# Patient Record
Sex: FEMALE | Race: WHITE | Employment: UNEMPLOYED | URBAN - METROPOLITAN AREA
[De-identification: names, ages, dates, MRNs, and addresses within clinical notes are randomized per-mention and may not be internally consistent; named-entity substitution may affect disease eponyms.]

---

## 2017-01-12 ENCOUNTER — TELEPHONE (OUTPATIENT)
Dept: FAMILY MEDICINE CLINIC | Age: 62
End: 2017-01-12

## 2017-01-12 ENCOUNTER — OFFICE VISIT (OUTPATIENT)
Dept: FAMILY MEDICINE CLINIC | Age: 62
End: 2017-01-12

## 2017-01-12 VITALS
TEMPERATURE: 98.1 F | HEART RATE: 100 BPM | RESPIRATION RATE: 16 BRPM | WEIGHT: 113 LBS | BODY MASS INDEX: 19.29 KG/M2 | SYSTOLIC BLOOD PRESSURE: 138 MMHG | HEIGHT: 64 IN | OXYGEN SATURATION: 95 % | DIASTOLIC BLOOD PRESSURE: 82 MMHG

## 2017-01-12 DIAGNOSIS — M54.5 CHRONIC LOW BACK PAIN, UNSPECIFIED BACK PAIN LATERALITY, WITH SCIATICA PRESENCE UNSPECIFIED: ICD-10-CM

## 2017-01-12 DIAGNOSIS — J44.9 CHRONIC OBSTRUCTIVE PULMONARY DISEASE, UNSPECIFIED COPD TYPE (HCC): Primary | ICD-10-CM

## 2017-01-12 DIAGNOSIS — K21.00 REFLUX ESOPHAGITIS: ICD-10-CM

## 2017-01-12 DIAGNOSIS — G89.29 CHRONIC LOW BACK PAIN, UNSPECIFIED BACK PAIN LATERALITY, WITH SCIATICA PRESENCE UNSPECIFIED: ICD-10-CM

## 2017-01-12 PROCEDURE — 3017F COLORECTAL CA SCREEN DOC REV: CPT | Performed by: FAMILY MEDICINE

## 2017-01-12 PROCEDURE — 4004F PT TOBACCO SCREEN RCVD TLK: CPT | Performed by: FAMILY MEDICINE

## 2017-01-12 PROCEDURE — G8926 SPIRO NO PERF OR DOC: HCPCS | Performed by: FAMILY MEDICINE

## 2017-01-12 PROCEDURE — 3014F SCREEN MAMMO DOC REV: CPT | Performed by: FAMILY MEDICINE

## 2017-01-12 PROCEDURE — G8428 CUR MEDS NOT DOCUMENT: HCPCS | Performed by: FAMILY MEDICINE

## 2017-01-12 PROCEDURE — G8420 CALC BMI NORM PARAMETERS: HCPCS | Performed by: FAMILY MEDICINE

## 2017-01-12 PROCEDURE — G8484 FLU IMMUNIZE NO ADMIN: HCPCS | Performed by: FAMILY MEDICINE

## 2017-01-12 PROCEDURE — 3023F SPIROM DOC REV: CPT | Performed by: FAMILY MEDICINE

## 2017-01-12 PROCEDURE — 99214 OFFICE O/P EST MOD 30 MIN: CPT | Performed by: FAMILY MEDICINE

## 2017-01-12 RX ORDER — ALBUTEROL SULFATE 90 UG/1
2 AEROSOL, METERED RESPIRATORY (INHALATION) EVERY 6 HOURS PRN
Qty: 1 INHALER | Refills: 5 | Status: CANCELLED | OUTPATIENT
Start: 2017-01-12

## 2017-01-12 RX ORDER — CYCLOBENZAPRINE HCL 10 MG
TABLET ORAL
Qty: 180 TABLET | Refills: 1 | Status: SHIPPED | OUTPATIENT
Start: 2017-01-12 | End: 2017-07-13 | Stop reason: SDUPTHER

## 2017-01-12 RX ORDER — OMEPRAZOLE 20 MG/1
20 CAPSULE, DELAYED RELEASE ORAL DAILY
Qty: 90 CAPSULE | Refills: 1 | Status: SHIPPED | OUTPATIENT
Start: 2017-01-12 | End: 2017-08-03 | Stop reason: SDUPTHER

## 2017-01-12 RX ORDER — THEOPHYLLINE 300 MG/1
TABLET, EXTENDED RELEASE ORAL
Qty: 180 TABLET | Refills: 1 | Status: SHIPPED | OUTPATIENT
Start: 2017-01-12 | End: 2017-05-26 | Stop reason: SDUPTHER

## 2017-02-14 ENCOUNTER — TELEPHONE (OUTPATIENT)
Dept: SURGERY | Age: 62
End: 2017-02-14

## 2017-05-31 RX ORDER — THEOPHYLLINE 300 MG/1
TABLET, EXTENDED RELEASE ORAL
Qty: 60 TABLET | Refills: 1 | Status: SHIPPED | OUTPATIENT
Start: 2017-05-31 | End: 2017-06-01 | Stop reason: SDUPTHER

## 2017-06-01 RX ORDER — THEOPHYLLINE 300 MG/1
TABLET, EXTENDED RELEASE ORAL
Qty: 180 TABLET | Refills: 3 | Status: SHIPPED | OUTPATIENT
Start: 2017-06-01 | End: 2017-11-14 | Stop reason: SDUPTHER

## 2017-07-13 RX ORDER — CYCLOBENZAPRINE HCL 10 MG
TABLET ORAL
Qty: 180 TABLET | Refills: 1 | Status: SHIPPED | OUTPATIENT
Start: 2017-07-13 | End: 2017-08-03 | Stop reason: SDUPTHER

## 2017-08-03 RX ORDER — OMEPRAZOLE 20 MG/1
20 CAPSULE, DELAYED RELEASE ORAL DAILY
Qty: 90 CAPSULE | Refills: 1 | Status: SHIPPED | OUTPATIENT
Start: 2017-08-03 | End: 2017-11-14 | Stop reason: SDUPTHER

## 2017-08-03 RX ORDER — CYCLOBENZAPRINE HCL 10 MG
TABLET ORAL
Qty: 180 TABLET | Refills: 1 | Status: SHIPPED | OUTPATIENT
Start: 2017-08-03 | End: 2017-11-14 | Stop reason: SDUPTHER

## 2017-09-15 ENCOUNTER — TELEPHONE (OUTPATIENT)
Dept: FAMILY MEDICINE CLINIC | Age: 62
End: 2017-09-15

## 2017-09-15 RX ORDER — CIPROFLOXACIN 500 MG/1
500 TABLET, FILM COATED ORAL 2 TIMES DAILY
Qty: 14 TABLET | Refills: 0 | Status: SHIPPED | OUTPATIENT
Start: 2017-09-15 | End: 2017-09-22

## 2017-10-26 ENCOUNTER — TELEPHONE (OUTPATIENT)
Dept: FAMILY MEDICINE CLINIC | Age: 62
End: 2017-10-26

## 2017-11-14 ENCOUNTER — OFFICE VISIT (OUTPATIENT)
Dept: FAMILY MEDICINE CLINIC | Age: 62
End: 2017-11-14

## 2017-11-14 VITALS
RESPIRATION RATE: 16 BRPM | HEART RATE: 68 BPM | HEIGHT: 64 IN | SYSTOLIC BLOOD PRESSURE: 130 MMHG | BODY MASS INDEX: 19.63 KG/M2 | WEIGHT: 115 LBS | OXYGEN SATURATION: 98 % | DIASTOLIC BLOOD PRESSURE: 80 MMHG

## 2017-11-14 DIAGNOSIS — G89.29 CHRONIC LOW BACK PAIN, UNSPECIFIED BACK PAIN LATERALITY, WITH SCIATICA PRESENCE UNSPECIFIED: ICD-10-CM

## 2017-11-14 DIAGNOSIS — K21.00 REFLUX ESOPHAGITIS: ICD-10-CM

## 2017-11-14 DIAGNOSIS — J44.9 CHRONIC OBSTRUCTIVE PULMONARY DISEASE, UNSPECIFIED COPD TYPE (HCC): Primary | ICD-10-CM

## 2017-11-14 DIAGNOSIS — M54.5 CHRONIC LOW BACK PAIN, UNSPECIFIED BACK PAIN LATERALITY, WITH SCIATICA PRESENCE UNSPECIFIED: ICD-10-CM

## 2017-11-14 LAB
APPEARANCE FLUID: NORMAL
BILIRUBIN, POC: NORMAL
BLOOD URINE, POC: NORMAL
CLARITY, POC: CLEAR
COLOR, POC: YELLOW
GLUCOSE URINE, POC: NORMAL
KETONES, POC: NORMAL
LEUKOCYTE EST, POC: NORMAL
NITRITE, POC: NORMAL
PH, POC: 5.5
PROTEIN, POC: NORMAL
SPECIFIC GRAVITY, POC: 1.01
UROBILINOGEN, POC: 0.2

## 2017-11-14 PROCEDURE — 81002 URINALYSIS NONAUTO W/O SCOPE: CPT | Performed by: FAMILY MEDICINE

## 2017-11-14 PROCEDURE — 3017F COLORECTAL CA SCREEN DOC REV: CPT | Performed by: FAMILY MEDICINE

## 2017-11-14 PROCEDURE — 3023F SPIROM DOC REV: CPT | Performed by: FAMILY MEDICINE

## 2017-11-14 PROCEDURE — G8420 CALC BMI NORM PARAMETERS: HCPCS | Performed by: FAMILY MEDICINE

## 2017-11-14 PROCEDURE — G8926 SPIRO NO PERF OR DOC: HCPCS | Performed by: FAMILY MEDICINE

## 2017-11-14 PROCEDURE — 99214 OFFICE O/P EST MOD 30 MIN: CPT | Performed by: FAMILY MEDICINE

## 2017-11-14 PROCEDURE — G8484 FLU IMMUNIZE NO ADMIN: HCPCS | Performed by: FAMILY MEDICINE

## 2017-11-14 PROCEDURE — 3014F SCREEN MAMMO DOC REV: CPT | Performed by: FAMILY MEDICINE

## 2017-11-14 PROCEDURE — G8427 DOCREV CUR MEDS BY ELIG CLIN: HCPCS | Performed by: FAMILY MEDICINE

## 2017-11-14 PROCEDURE — 4004F PT TOBACCO SCREEN RCVD TLK: CPT | Performed by: FAMILY MEDICINE

## 2017-11-14 RX ORDER — ALBUTEROL SULFATE 90 UG/1
2 AEROSOL, METERED RESPIRATORY (INHALATION) EVERY 6 HOURS PRN
Qty: 1 INHALER | Refills: 5 | Status: SHIPPED | OUTPATIENT
Start: 2017-11-14

## 2017-11-14 RX ORDER — THEOPHYLLINE 300 MG/1
TABLET, EXTENDED RELEASE ORAL
Qty: 180 TABLET | Refills: 1 | Status: SHIPPED | OUTPATIENT
Start: 2017-11-14

## 2017-11-14 RX ORDER — CYCLOBENZAPRINE HCL 10 MG
TABLET ORAL
Qty: 180 TABLET | Refills: 1 | Status: SHIPPED | OUTPATIENT
Start: 2017-11-14

## 2017-11-14 RX ORDER — OMEPRAZOLE 20 MG/1
20 CAPSULE, DELAYED RELEASE ORAL DAILY
Qty: 90 CAPSULE | Refills: 1 | Status: SHIPPED | OUTPATIENT
Start: 2017-11-14

## 2017-11-14 RX ORDER — NITROFURANTOIN 25; 75 MG/1; MG/1
100 CAPSULE ORAL 2 TIMES DAILY
Qty: 20 CAPSULE | Refills: 0 | Status: SHIPPED | OUTPATIENT
Start: 2017-11-14 | End: 2018-08-17 | Stop reason: ALTCHOICE

## 2017-11-14 ASSESSMENT — PATIENT HEALTH QUESTIONNAIRE - PHQ9
2. FEELING DOWN, DEPRESSED OR HOPELESS: 0
SUM OF ALL RESPONSES TO PHQ QUESTIONS 1-9: 0
1. LITTLE INTEREST OR PLEASURE IN DOING THINGS: 0
SUM OF ALL RESPONSES TO PHQ9 QUESTIONS 1 & 2: 0

## 2017-11-14 NOTE — PROGRESS NOTES
SUBJECTIVE:  F/U COPD, GERD, Chronic low back pain. States dyspnea improved with continued use of Spiriva, needs refill. No side effects. She wants to continue to avoid narcotics for her chronic low back pain, desires refill of Flexeril - helps with no sedation or other side effects. GERD sx's controlled with omeprazole, with no side effects. She desires UA due to low back and left flank discomfort. Tob: 1 ppd. OBJECTIVE:  /80 (Site: Right Arm, Position: Sitting, Cuff Size: Medium Adult)   Pulse 68   Resp 16   Ht 5' 4\" (1.626 m)   Wt 115 lb (52.2 kg)   SpO2 98%   BMI 19.74 kg/m²     Lungs: Faint expiratory wheeze bilaterally. ASSESSMENT:  1. Chronic obstructive pulmonary disease, unspecified COPD type ([address]    2. Reflux esophagitis    3. Chronic low back pain, unspecified back pain laterality, with sciatica presence unspecified        PLAN:    Orders Placed This Encounter   Medications    cyclobenzaprine (FLEXERIL) 10 MG tablet     Sig: TAKE 1 TABLET BY MOUTH 2 TIMES DAILY AS NEEDED FOR MUSCLE SPASMS     Dispense:  180 tablet     Refill:  1     Generic For:FLEXERIL   10MG  01/20/2016 5:23:18 PM    tiotropium (Fleurette Genesis) 18 MCG inhalation capsule     Sig: Inhale 1 capsule into the lungs daily     Dispense:  180 capsule     Refill:  1    theophylline (THEODUR) 300 MG extended release tablet     Sig: TAKE 1 TABLET TWICE A DAY     Dispense:  180 tablet     Refill:  1    omeprazole (PRILOSEC) 20 MG delayed release capsule     Sig: Take 1 capsule by mouth daily     Dispense:  90 capsule     Refill:  1    albuterol sulfate  (90 Base) MCG/ACT inhaler     Sig: Inhale 2 puffs into the lungs every 6 hours as needed (Take as needed)     Dispense:  1 Inhaler     Refill:  5       Orders Placed This Encounter   Procedures    POCT Urinalysis no Micro       Follow-up appointment in 6 months.

## 2018-02-12 ENCOUNTER — TELEPHONE (OUTPATIENT)
Dept: CASE MANAGEMENT | Age: 63
End: 2018-02-12

## 2018-08-14 ENCOUNTER — OFFICE VISIT (OUTPATIENT)
Dept: ORTHOPEDIC SURGERY | Age: 63
End: 2018-08-14

## 2018-08-14 VITALS
BODY MASS INDEX: 21.34 KG/M2 | SYSTOLIC BLOOD PRESSURE: 109 MMHG | DIASTOLIC BLOOD PRESSURE: 69 MMHG | WEIGHT: 125 LBS | HEART RATE: 71 BPM | HEIGHT: 64 IN

## 2018-08-14 DIAGNOSIS — M79.672 LEFT FOOT PAIN: Primary | ICD-10-CM

## 2018-08-14 DIAGNOSIS — S92.032A CLOSED DISPLACED AVULSION FRACTURE OF TUBEROSITY OF LEFT CALCANEUS, INITIAL ENCOUNTER: ICD-10-CM

## 2018-08-14 PROCEDURE — 99203 OFFICE O/P NEW LOW 30 MIN: CPT | Performed by: ORTHOPAEDIC SURGERY

## 2018-08-14 RX ORDER — CEPHALEXIN 500 MG/1
500 CAPSULE ORAL 4 TIMES DAILY
Qty: 8 CAPSULE | Refills: 0 | Status: SHIPPED | OUTPATIENT
Start: 2018-08-14

## 2018-08-14 RX ORDER — OXYCODONE HYDROCHLORIDE AND ACETAMINOPHEN 5; 325 MG/1; MG/1
1 TABLET ORAL EVERY 6 HOURS PRN
Qty: 28 TABLET | Refills: 0 | Status: SHIPPED | OUTPATIENT
Start: 2018-08-14 | End: 2018-08-21

## 2018-08-15 PROBLEM — S92.032A CLOSED DISPLACED AVULSION FRACTURE OF TUBEROSITY OF LEFT CALCANEUS: Status: ACTIVE | Noted: 2018-08-15

## 2018-08-15 NOTE — PROGRESS NOTES
left    Special Tests:  Negative Call test    Skin: There are no rashes, ulcerations or lesions. Gait: Antalgic    Reflex 2+ and symmetric    Additional Comments:       Additional Examinations:         Right Lower Extremity: Examination of the right lower extremity does not show any tenderness, deformity or injury. Range of motion is unremarkable. There is no gross instability. There are no rashes, ulcerations or lesions. Strength and tone are normal.    Radiology:     X-rays obtained and reviewed in office:  Views 2  Location left calcaneus  Impression shows evidence of the tongue-type fracture involving the tuberosity with at least 2-3 cm of displacement          Assessment :  Left displaced calcaneus fracture    Impression:  Encounter Diagnoses   Name Primary?  Left foot pain Yes    Closed displaced avulsion fracture of tuberosity of left calcaneus, initial encounter        Office Procedures:  Orders Placed This Encounter   Procedures    XR CALCANEUS LEFT (MIN 2 VIEWS)     Specify if WB or Non-WB     Order Specific Question:   Reason for exam:     Answer:   Heel Pain       Treatment Plan:  I spent 30 minutes with this patient greater than 50% Face-To-Face Discussing Treatment Options. I Would Recommend She Have This Open Reduced and Internally Fixated. Risks of Surgery Were Discussed Including Her Risk of Smoking. Smoking Cessation Was Discussed Approximately 5 Minutes. I Gave Her Percocet and Keflex and Put Her into a [place] Dressing with PSO.   She'll Follow up with Me Postop

## 2018-08-17 ENCOUNTER — OFFICE VISIT (OUTPATIENT)
Dept: ORTHOPEDIC SURGERY | Age: 63
End: 2018-08-17

## 2018-08-17 ENCOUNTER — ANESTHESIA EVENT (OUTPATIENT)
Dept: OPERATING ROOM | Age: 63
End: 2018-08-17
Payer: COMMERCIAL

## 2018-08-17 VITALS
WEIGHT: 120 LBS | DIASTOLIC BLOOD PRESSURE: 63 MMHG | SYSTOLIC BLOOD PRESSURE: 116 MMHG | HEART RATE: 88 BPM | HEIGHT: 64 IN | BODY MASS INDEX: 20.49 KG/M2

## 2018-08-17 DIAGNOSIS — S92.032D CLOSED DISPLACED AVULSION FRACTURE OF TUBEROSITY OF LEFT CALCANEUS WITH ROUTINE HEALING, SUBSEQUENT ENCOUNTER: Primary | ICD-10-CM

## 2018-08-17 PROCEDURE — 99212 OFFICE O/P EST SF 10 MIN: CPT | Performed by: ORTHOPAEDIC SURGERY

## 2018-08-17 NOTE — PROGRESS NOTES
Subjective: Patient states that she is here for follow-up of her left calcaneus tuberosity fracture. States that her pain is minimal and she is not smoking as much. Here for a skin check  Objective: Physical exam shows swelling is significantly decreased from previous her skin looks really good except for a small area on the lateral aspect of the lateral malleolus. No evidence of compartment syndrome no fracture blisters no erythema or signs of infection  Imaging:  Assessment and plan: We put her into a new splint with a stirrup to help keep the swelling laterally down. She'll get set up for surgery on Monday it's her no guarantees were given or implied.   We did discuss smoking cessation

## 2018-08-20 ENCOUNTER — ANESTHESIA (OUTPATIENT)
Dept: OPERATING ROOM | Age: 63
End: 2018-08-20
Payer: COMMERCIAL

## 2018-08-20 ENCOUNTER — HOSPITAL ENCOUNTER (OUTPATIENT)
Age: 63
Setting detail: OUTPATIENT SURGERY
Discharge: HOME | End: 2018-08-20
Attending: ORTHOPAEDIC SURGERY | Admitting: ORTHOPAEDIC SURGERY
Payer: COMMERCIAL

## 2018-08-20 VITALS
BODY MASS INDEX: 21.34 KG/M2 | DIASTOLIC BLOOD PRESSURE: 71 MMHG | TEMPERATURE: 98 F | SYSTOLIC BLOOD PRESSURE: 108 MMHG | OXYGEN SATURATION: 93 % | RESPIRATION RATE: 20 BRPM | WEIGHT: 125 LBS | HEIGHT: 64 IN | HEART RATE: 78 BPM

## 2018-08-20 VITALS
DIASTOLIC BLOOD PRESSURE: 68 MMHG | OXYGEN SATURATION: 98 % | RESPIRATION RATE: 4 BRPM | SYSTOLIC BLOOD PRESSURE: 105 MMHG

## 2018-08-20 DIAGNOSIS — S92.032D CLOSED DISPLACED AVULSION FRACTURE OF TUBEROSITY OF LEFT CALCANEUS WITH ROUTINE HEALING, SUBSEQUENT ENCOUNTER: Primary | ICD-10-CM

## 2018-08-20 PROCEDURE — 7100000000 HC PACU RECOVERY - FIRST 15 MIN: Performed by: ORTHOPAEDIC SURGERY

## 2018-08-20 PROCEDURE — 3600000014 HC SURGERY LEVEL 4 ADDTL 15MIN: Performed by: ORTHOPAEDIC SURGERY

## 2018-08-20 PROCEDURE — 6360000002 HC RX W HCPCS: Performed by: NURSE ANESTHETIST, CERTIFIED REGISTERED

## 2018-08-20 PROCEDURE — 3600000004 HC SURGERY LEVEL 4 BASE: Performed by: ORTHOPAEDIC SURGERY

## 2018-08-20 PROCEDURE — 64445 NJX AA&/STRD SCIATIC NRV IMG: CPT | Performed by: ANESTHESIOLOGY

## 2018-08-20 PROCEDURE — 2500000003 HC RX 250 WO HCPCS: Performed by: NURSE ANESTHETIST, CERTIFIED REGISTERED

## 2018-08-20 PROCEDURE — 2500000003 HC RX 250 WO HCPCS: Performed by: ORTHOPAEDIC SURGERY

## 2018-08-20 PROCEDURE — 2580000003 HC RX 258: Performed by: ORTHOPAEDIC SURGERY

## 2018-08-20 PROCEDURE — C1713 ANCHOR/SCREW BN/BN,TIS/BN: HCPCS | Performed by: ORTHOPAEDIC SURGERY

## 2018-08-20 PROCEDURE — 3700000001 HC ADD 15 MINUTES (ANESTHESIA): Performed by: ORTHOPAEDIC SURGERY

## 2018-08-20 PROCEDURE — 7100000001 HC PACU RECOVERY - ADDTL 15 MIN: Performed by: ORTHOPAEDIC SURGERY

## 2018-08-20 PROCEDURE — 2580000003 HC RX 258: Performed by: ANESTHESIOLOGY

## 2018-08-20 PROCEDURE — 3700000000 HC ANESTHESIA ATTENDED CARE: Performed by: ORTHOPAEDIC SURGERY

## 2018-08-20 PROCEDURE — 7100000011 HC PHASE II RECOVERY - ADDTL 15 MIN: Performed by: ORTHOPAEDIC SURGERY

## 2018-08-20 PROCEDURE — 7100000010 HC PHASE II RECOVERY - FIRST 15 MIN: Performed by: ORTHOPAEDIC SURGERY

## 2018-08-20 PROCEDURE — 2709999900 HC NON-CHARGEABLE SUPPLY: Performed by: ORTHOPAEDIC SURGERY

## 2018-08-20 DEVICE — SCREW BNE L42MM DIA4.5MM PROX CORT TIB S STL ST LOK FULL: Type: IMPLANTABLE DEVICE | Site: ANKLE | Status: FUNCTIONAL

## 2018-08-20 DEVICE — WASHER ORTH OD13.5MM ID4MM PEEK 6% BASO4 SPIK FOR 3.5/4MM: Type: IMPLANTABLE DEVICE | Site: ANKLE | Status: FUNCTIONAL

## 2018-08-20 DEVICE — SCREW BNE L46MM DIA4.5MM PROX CORT TIB S STL ST LOK FULL: Type: IMPLANTABLE DEVICE | Site: ANKLE | Status: FUNCTIONAL

## 2018-08-20 RX ORDER — LIDOCAINE HYDROCHLORIDE 10 MG/ML
1 INJECTION, SOLUTION EPIDURAL; INFILTRATION; INTRACAUDAL; PERINEURAL
Status: DISCONTINUED | OUTPATIENT
Start: 2018-08-20 | End: 2018-08-20 | Stop reason: HOSPADM

## 2018-08-20 RX ORDER — HYDRALAZINE HYDROCHLORIDE 20 MG/ML
5 INJECTION INTRAMUSCULAR; INTRAVENOUS EVERY 30 MIN PRN
Status: DISCONTINUED | OUTPATIENT
Start: 2018-08-20 | End: 2018-08-20 | Stop reason: HOSPADM

## 2018-08-20 RX ORDER — LIDOCAINE HYDROCHLORIDE 20 MG/ML
INJECTION, SOLUTION INFILTRATION; PERINEURAL PRN
Status: DISCONTINUED | OUTPATIENT
Start: 2018-08-20 | End: 2018-08-20 | Stop reason: SDUPTHER

## 2018-08-20 RX ORDER — MEPERIDINE HYDROCHLORIDE 50 MG/ML
12.5 INJECTION INTRAMUSCULAR; INTRAVENOUS; SUBCUTANEOUS EVERY 5 MIN PRN
Status: DISCONTINUED | OUTPATIENT
Start: 2018-08-20 | End: 2018-08-20 | Stop reason: HOSPADM

## 2018-08-20 RX ORDER — SODIUM CHLORIDE, SODIUM LACTATE, POTASSIUM CHLORIDE, CALCIUM CHLORIDE 600; 310; 30; 20 MG/100ML; MG/100ML; MG/100ML; MG/100ML
INJECTION, SOLUTION INTRAVENOUS CONTINUOUS
Status: DISCONTINUED | OUTPATIENT
Start: 2018-08-20 | End: 2018-08-20 | Stop reason: HOSPADM

## 2018-08-20 RX ORDER — OXYCODONE HYDROCHLORIDE AND ACETAMINOPHEN 5; 325 MG/1; MG/1
2 TABLET ORAL PRN
Status: DISCONTINUED | OUTPATIENT
Start: 2018-08-20 | End: 2018-08-20 | Stop reason: HOSPADM

## 2018-08-20 RX ORDER — ROCURONIUM BROMIDE 10 MG/ML
INJECTION, SOLUTION INTRAVENOUS PRN
Status: DISCONTINUED | OUTPATIENT
Start: 2018-08-20 | End: 2018-08-20 | Stop reason: SDUPTHER

## 2018-08-20 RX ORDER — LABETALOL HYDROCHLORIDE 5 MG/ML
5 INJECTION, SOLUTION INTRAVENOUS
Status: DISCONTINUED | OUTPATIENT
Start: 2018-08-20 | End: 2018-08-20 | Stop reason: HOSPADM

## 2018-08-20 RX ORDER — OXYCODONE HYDROCHLORIDE AND ACETAMINOPHEN 5; 325 MG/1; MG/1
1 TABLET ORAL PRN
Status: DISCONTINUED | OUTPATIENT
Start: 2018-08-20 | End: 2018-08-20 | Stop reason: HOSPADM

## 2018-08-20 RX ORDER — DIPHENHYDRAMINE HYDROCHLORIDE 50 MG/ML
6.25 INJECTION INTRAMUSCULAR; INTRAVENOUS
Status: DISCONTINUED | OUTPATIENT
Start: 2018-08-20 | End: 2018-08-20 | Stop reason: HOSPADM

## 2018-08-20 RX ORDER — SODIUM CHLORIDE 0.9 % (FLUSH) 0.9 %
10 SYRINGE (ML) INJECTION EVERY 12 HOURS SCHEDULED
Status: DISCONTINUED | OUTPATIENT
Start: 2018-08-20 | End: 2018-08-20 | Stop reason: HOSPADM

## 2018-08-20 RX ORDER — MAGNESIUM HYDROXIDE 1200 MG/15ML
LIQUID ORAL CONTINUOUS PRN
Status: DISCONTINUED | OUTPATIENT
Start: 2018-08-20 | End: 2018-08-20 | Stop reason: HOSPADM

## 2018-08-20 RX ORDER — FENTANYL CITRATE 50 UG/ML
INJECTION, SOLUTION INTRAMUSCULAR; INTRAVENOUS PRN
Status: DISCONTINUED | OUTPATIENT
Start: 2018-08-20 | End: 2018-08-20 | Stop reason: SDUPTHER

## 2018-08-20 RX ORDER — ONDANSETRON 2 MG/ML
4 INJECTION INTRAMUSCULAR; INTRAVENOUS EVERY 30 MIN PRN
Status: DISCONTINUED | OUTPATIENT
Start: 2018-08-20 | End: 2018-08-20 | Stop reason: HOSPADM

## 2018-08-20 RX ORDER — OXYCODONE HYDROCHLORIDE AND ACETAMINOPHEN 5; 325 MG/1; MG/1
1 TABLET ORAL EVERY 6 HOURS PRN
Qty: 28 TABLET | Refills: 0 | Status: SHIPPED | OUTPATIENT
Start: 2018-08-20 | End: 2018-08-27

## 2018-08-20 RX ORDER — SODIUM CHLORIDE 0.9 % (FLUSH) 0.9 %
10 SYRINGE (ML) INJECTION PRN
Status: DISCONTINUED | OUTPATIENT
Start: 2018-08-20 | End: 2018-08-20 | Stop reason: HOSPADM

## 2018-08-20 RX ORDER — ONDANSETRON 2 MG/ML
INJECTION INTRAMUSCULAR; INTRAVENOUS PRN
Status: DISCONTINUED | OUTPATIENT
Start: 2018-08-20 | End: 2018-08-20 | Stop reason: SDUPTHER

## 2018-08-20 RX ORDER — PROPOFOL 10 MG/ML
INJECTION, EMULSION INTRAVENOUS PRN
Status: DISCONTINUED | OUTPATIENT
Start: 2018-08-20 | End: 2018-08-20 | Stop reason: SDUPTHER

## 2018-08-20 RX ADMIN — LIDOCAINE HYDROCHLORIDE 40 MG: 20 INJECTION, SOLUTION INFILTRATION; PERINEURAL at 13:25

## 2018-08-20 RX ADMIN — PROPOFOL 150 MG: 10 INJECTION, EMULSION INTRAVENOUS at 13:25

## 2018-08-20 RX ADMIN — Medication 900 MG: at 13:21

## 2018-08-20 RX ADMIN — ONDANSETRON 4 MG: 2 INJECTION INTRAMUSCULAR; INTRAVENOUS at 13:50

## 2018-08-20 RX ADMIN — SODIUM CHLORIDE, POTASSIUM CHLORIDE, SODIUM LACTATE AND CALCIUM CHLORIDE: 600; 310; 30; 20 INJECTION, SOLUTION INTRAVENOUS at 12:55

## 2018-08-20 RX ADMIN — SUGAMMADEX 200 MG: 100 INJECTION, SOLUTION INTRAVENOUS at 14:25

## 2018-08-20 RX ADMIN — FENTANYL CITRATE 50 MCG: 50 INJECTION INTRAMUSCULAR; INTRAVENOUS at 13:25

## 2018-08-20 RX ADMIN — ROCURONIUM BROMIDE 40 MG: 10 SOLUTION INTRAVENOUS at 13:25

## 2018-08-20 RX ADMIN — PROPOFOL 50 MG: 10 INJECTION, EMULSION INTRAVENOUS at 14:25

## 2018-08-20 ASSESSMENT — PULMONARY FUNCTION TESTS
PIF_VALUE: 11
PIF_VALUE: 17
PIF_VALUE: 17
PIF_VALUE: 21
PIF_VALUE: 20
PIF_VALUE: 22
PIF_VALUE: 16
PIF_VALUE: 22
PIF_VALUE: 12
PIF_VALUE: 12
PIF_VALUE: 21
PIF_VALUE: 10
PIF_VALUE: 22
PIF_VALUE: 21
PIF_VALUE: 16
PIF_VALUE: 22
PIF_VALUE: 16
PIF_VALUE: 1
PIF_VALUE: 22
PIF_VALUE: 21
PIF_VALUE: 22
PIF_VALUE: 22
PIF_VALUE: 21
PIF_VALUE: 22
PIF_VALUE: 21
PIF_VALUE: 24
PIF_VALUE: 21
PIF_VALUE: 16
PIF_VALUE: 21
PIF_VALUE: 10
PIF_VALUE: 21
PIF_VALUE: 16
PIF_VALUE: 21
PIF_VALUE: 1
PIF_VALUE: 22
PIF_VALUE: 1
PIF_VALUE: 21
PIF_VALUE: 22
PIF_VALUE: 3
PIF_VALUE: 21
PIF_VALUE: 21
PIF_VALUE: 1
PIF_VALUE: 21
PIF_VALUE: 21
PIF_VALUE: 5
PIF_VALUE: 21
PIF_VALUE: 23
PIF_VALUE: 2
PIF_VALUE: 12
PIF_VALUE: 20
PIF_VALUE: 24
PIF_VALUE: 13
PIF_VALUE: 21
PIF_VALUE: 4
PIF_VALUE: 22
PIF_VALUE: 24
PIF_VALUE: 0
PIF_VALUE: 25
PIF_VALUE: 12
PIF_VALUE: 23
PIF_VALUE: 22
PIF_VALUE: 2
PIF_VALUE: 1
PIF_VALUE: 17
PIF_VALUE: 21
PIF_VALUE: 21
PIF_VALUE: 2
PIF_VALUE: 17
PIF_VALUE: 21

## 2018-08-20 ASSESSMENT — PAIN SCALES - GENERAL
PAINLEVEL_OUTOF10: 0

## 2018-08-20 ASSESSMENT — PAIN DESCRIPTION - DESCRIPTORS: DESCRIPTORS: THROBBING

## 2018-08-20 ASSESSMENT — LIFESTYLE VARIABLES: SMOKING_STATUS: 1

## 2018-08-20 ASSESSMENT — PAIN - FUNCTIONAL ASSESSMENT: PAIN_FUNCTIONAL_ASSESSMENT: 0-10

## 2018-08-20 NOTE — ANESTHESIA POSTPROCEDURE EVALUATION
143                 10/07/2016 05:08 AM        K                        3.6                 10/07/2016 05:08 AM        CL                       106                 10/07/2016 05:08 AM        CO2                      26                  10/07/2016 05:08 AM        BUN                      15                  10/07/2016 05:08 AM        CREATININE               0.8                 10/07/2016 05:08 AM        GLUCOSE                  90                  10/07/2016 05:08 AM   COAGS  No results found for: PROTIME, INR, APTT    Intake & Output: In: 1200 (I.V.:1200)  Out: -     Nausea & Vomiting:  No    Level of Consciousness:  Awake    Pain Assessment:  Adequate analgesia    Anesthesia Complications:  No apparent anesthetic complications    SUMMARY      Vital signs stable  OK to discharge from Stage I post anesthesia care.   Care transferred from Anesthesiology department on discharge from perioperative area

## 2018-08-20 NOTE — ANESTHESIA PRE PROCEDURE
Department of Anesthesiology  Preprocedure Note       Name:  Giana Galaviz   Age:  66 y.o.  :  1955                                          MRN:  [id number]         Date:  2018      Surgeon: Katelyn German):  Stanislav Feng MD    Procedure: Procedure(s):  OPEN REDUCTION INTERNAL FIXATION LEFT CALCANEUS FRACTURE    Medications prior to admission:   Prior to Admission medications    Medication Sig Start Date End Date Taking? Authorizing Provider   oxyCODONE-acetaminophen (PERCOCET) 5-325 MG per tablet Take 1 tablet by mouth every 6 hours as needed for Pain for up to 7 days. Intended supply: 7 days. Take lowest dose possible to manage pain.  Earliest Fill Date: 18  Stanislav Feng MD   cephALEXin [facility]) 500 MG capsule Take 1 capsule by mouth 4 times daily 18   Stanislav Feng MD   cyclobenzaprine (FLEXERIL) 10 MG tablet TAKE 1 TABLET BY MOUTH 2 TIMES DAILY AS NEEDED FOR MUSCLE SPASMS 17   Albertina Fernandez MD   tiotropium (Ulysses Massing) 18 MCG inhalation capsule Inhale 1 capsule into the lungs daily 17   Albertina Fernandez MD   theophylline (THEODUR) 300 MG extended release tablet TAKE 1 TABLET TWICE A DAY 17   Albertina Fernandez MD   omeprazole (PRILOSEC) 20 MG delayed release capsule Take 1 capsule by mouth daily 17   Albertina Fernandez MD   albuterol sulfate  (90 Base) MCG/ACT inhaler Inhale 2 puffs into the lungs every 6 hours as needed (Take as needed) 17   Albertina Fernandez MD   sertraline (ZOLOFT) 100 MG tablet Take 100 mg by mouth daily    Historical Provider, MD       Current medications:    Current Facility-Administered Medications   Medication Dose Route Frequency Provider Last Rate Last Dose    HYDROmorphone (DILAUDID) injection 0.5 mg  0.5 mg Intravenous Q10 Min PRN Niko Aragon MD        HYDROmorphone (DILAUDID) injection 0.5 mg  0.5 mg Intravenous Q5 Min PRN Niko Aragon MD       [facility]

## 2018-08-20 NOTE — OP NOTE
[place], [place] [address]                                 OPERATIVE REPORT    PATIENT NAME: Charline Byrnes                    :        1955  MED REC NO:   [id number]                          ROOM:  ACCOUNT NO:   [de-identified]                           ADMIT DATE: 2018  PROVIDER:     Jazmin Colvin MD    DATE OF PROCEDURE:  2018    PREOPERATIVE DIAGNOSIS:  Left calcaneus fracture. POSTOPERATIVE DIAGNOSIS:  Left calcaneus fracture. OPERATION PERFORMED:  ORIF left calcaneus fracture. PRIMARY SURGEON:  Jazmin Colvin MD    ANESTHESIA:  General.    ANTIBIOTICS:  Cleocin. DRAINS:  None. TUBES:  None. SPECIMENS:  None. ESTIMATED BLOOD LOSS:  Less than 50 mL. TOURNIQUET TIME:  Less than 1 hour. INDICATIONS:  The patient is a 61-year-old female that slipped on a _____  while in flip-flops on 2018 and was found to have a tongue type  calcaneus fracture. She is here now for open reduction internal fixation. Risks and benefits of surgery were explained to the patient. Informed  consent was signed in the chart prior to surgery. No guarantees were given  or implied. PROCEDURE:  The patient was brought to the operating room and after  adequate general anesthesia, was placed in the prone position. Bony  prominences were padded appropriately and then left lower extremity prepped  and draped in sterile fashion after a nonsterile tourniquet was placed  around the proximal aspect of her left upper thigh. Her leg was then  exsanguinated and tourniquet inflated to 350 mmHg. A longitudinal incision  was made on the posterior lateral aspect of the calcaneus extending from  approximately 3-4 cm above the insertion of the Achilles proximally. It  was carried down through the subcutaneous tissue using Metzenbaum scissors.   Care was taken to identify the sural nerve which was retracted anteriorly  away

## 2018-09-04 DIAGNOSIS — S92.032D CLOSED DISPLACED AVULSION FRACTURE OF TUBEROSITY OF LEFT CALCANEUS WITH ROUTINE HEALING, SUBSEQUENT ENCOUNTER: Primary | ICD-10-CM

## 2018-09-04 RX ORDER — OXYCODONE HYDROCHLORIDE AND ACETAMINOPHEN 5; 325 MG/1; MG/1
1 TABLET ORAL EVERY 8 HOURS PRN
Qty: 21 TABLET | Refills: 0 | Status: SHIPPED | OUTPATIENT
Start: 2018-09-04 | End: 2018-09-05 | Stop reason: CLARIF

## 2018-09-05 ENCOUNTER — OFFICE VISIT (OUTPATIENT)
Dept: ORTHOPEDIC SURGERY | Age: 63
End: 2018-09-05

## 2018-09-05 VITALS — SYSTOLIC BLOOD PRESSURE: 133 MMHG | HEART RATE: 89 BPM | DIASTOLIC BLOOD PRESSURE: 76 MMHG

## 2018-09-05 DIAGNOSIS — S92.032D CLOSED DISPLACED AVULSION FRACTURE OF TUBEROSITY OF LEFT CALCANEUS WITH ROUTINE HEALING, SUBSEQUENT ENCOUNTER: ICD-10-CM

## 2018-09-05 DIAGNOSIS — M79.672 CHRONIC HEEL PAIN, LEFT: Primary | ICD-10-CM

## 2018-09-05 DIAGNOSIS — G89.29 CHRONIC HEEL PAIN, LEFT: Primary | ICD-10-CM

## 2018-09-05 PROCEDURE — 99024 POSTOP FOLLOW-UP VISIT: CPT | Performed by: ORTHOPAEDIC SURGERY

## 2018-09-05 PROCEDURE — 29405 APPL SHORT LEG CAST: CPT | Performed by: ORTHOPAEDIC SURGERY

## 2018-09-05 RX ORDER — OXYCODONE HYDROCHLORIDE AND ACETAMINOPHEN 5; 325 MG/1; MG/1
1 TABLET ORAL EVERY 8 HOURS PRN
Qty: 21 TABLET | Refills: 0 | Status: SHIPPED | OUTPATIENT
Start: 2018-09-05 | End: 2018-09-12

## 2018-09-12 ENCOUNTER — OFFICE VISIT (OUTPATIENT)
Dept: ORTHOPEDIC SURGERY | Age: 63
End: 2018-09-12

## 2018-09-12 VITALS
WEIGHT: 120 LBS | BODY MASS INDEX: 20.49 KG/M2 | HEIGHT: 64 IN | HEART RATE: 78 BPM | DIASTOLIC BLOOD PRESSURE: 81 MMHG | SYSTOLIC BLOOD PRESSURE: 123 MMHG

## 2018-09-12 DIAGNOSIS — S92.032A CLOSED DISPLACED AVULSION FRACTURE OF TUBEROSITY OF LEFT CALCANEUS, INITIAL ENCOUNTER: Primary | ICD-10-CM

## 2018-09-12 PROCEDURE — 99024 POSTOP FOLLOW-UP VISIT: CPT | Performed by: ORTHOPAEDIC SURGERY

## 2018-09-12 RX ORDER — OXYCODONE HYDROCHLORIDE AND ACETAMINOPHEN 5; 325 MG/1; MG/1
1 TABLET ORAL 2 TIMES DAILY PRN
Qty: 14 TABLET | Refills: 0 | Status: SHIPPED | OUTPATIENT
Start: 2018-09-12 | End: 2018-09-19

## 2018-09-26 ENCOUNTER — OFFICE VISIT (OUTPATIENT)
Dept: ORTHOPEDIC SURGERY | Age: 63
End: 2018-09-26

## 2018-09-26 VITALS — HEART RATE: 117 BPM | SYSTOLIC BLOOD PRESSURE: 129 MMHG | DIASTOLIC BLOOD PRESSURE: 79 MMHG

## 2018-09-26 DIAGNOSIS — S92.032D CLOSED DISPLACED AVULSION FRACTURE OF TUBEROSITY OF LEFT CALCANEUS WITH ROUTINE HEALING, SUBSEQUENT ENCOUNTER: ICD-10-CM

## 2018-09-26 DIAGNOSIS — M79.672 PAIN OF LEFT HEEL: Primary | ICD-10-CM

## 2018-09-26 PROCEDURE — 99024 POSTOP FOLLOW-UP VISIT: CPT | Performed by: ORTHOPAEDIC SURGERY

## 2018-09-26 RX ORDER — OXYCODONE HYDROCHLORIDE AND ACETAMINOPHEN 5; 325 MG/1; MG/1
1 TABLET ORAL EVERY 8 HOURS PRN
Qty: 21 TABLET | Refills: 0 | Status: SHIPPED | OUTPATIENT
Start: 2018-09-26 | End: 2018-10-03

## 2018-10-17 ENCOUNTER — TELEPHONE (OUTPATIENT)
Dept: ORTHOPEDIC SURGERY | Age: 63
End: 2018-10-17

## 2018-10-17 ENCOUNTER — OFFICE VISIT (OUTPATIENT)
Dept: ORTHOPEDIC SURGERY | Age: 63
End: 2018-10-17
Payer: COMMERCIAL

## 2018-10-17 VITALS
HEART RATE: 111 BPM | BODY MASS INDEX: 20.47 KG/M2 | HEIGHT: 64 IN | WEIGHT: 119.93 LBS | DIASTOLIC BLOOD PRESSURE: 88 MMHG | SYSTOLIC BLOOD PRESSURE: 148 MMHG

## 2018-10-17 DIAGNOSIS — M79.672 CHRONIC HEEL PAIN, LEFT: Primary | ICD-10-CM

## 2018-10-17 DIAGNOSIS — G89.29 CHRONIC HEEL PAIN, LEFT: Primary | ICD-10-CM

## 2018-10-17 PROCEDURE — 99024 POSTOP FOLLOW-UP VISIT: CPT | Performed by: ORTHOPAEDIC SURGERY

## 2018-10-17 PROCEDURE — L4360 PNEUMAT WALKING BOOT PRE CST: HCPCS | Performed by: ORTHOPAEDIC SURGERY

## 2018-11-14 ENCOUNTER — OFFICE VISIT (OUTPATIENT)
Dept: ORTHOPEDIC SURGERY | Age: 63
End: 2018-11-14
Payer: COMMERCIAL

## 2018-11-14 VITALS
DIASTOLIC BLOOD PRESSURE: 82 MMHG | SYSTOLIC BLOOD PRESSURE: 126 MMHG | WEIGHT: 119.93 LBS | HEIGHT: 64 IN | BODY MASS INDEX: 20.47 KG/M2 | HEART RATE: 74 BPM

## 2018-11-14 DIAGNOSIS — S92.032A CLOSED DISPLACED AVULSION FRACTURE OF TUBEROSITY OF LEFT CALCANEUS, INITIAL ENCOUNTER: Primary | ICD-10-CM

## 2018-11-14 PROCEDURE — 99024 POSTOP FOLLOW-UP VISIT: CPT | Performed by: ORTHOPAEDIC SURGERY

## 2018-11-14 PROCEDURE — L3170 FOOT PLAS HEEL STABI PRE OTS: HCPCS | Performed by: ORTHOPAEDIC SURGERY

## 2018-11-14 NOTE — PROGRESS NOTES
Subjective: Patient states that she is here for follow-up of a left calcaneus fracture open reduction internal fixation the . She states that she has some weakness and balance issues but no pain in the left heel. Objective: Physical exam shows incisions well-healed now. Swelling is minimal she does have peripheral vascular disease and is a smoker. Anterior drawer and talar tilt showed no gross laxity. 10° of dorsiflexion and 20-30° of plantarflexion  Imagin views of the left calcaneus show no change in the position of her hardware her more posterior screw appears to be toggling somewhat  Assessment and plan: I told like her to stay in the boot and she can start physical therapy gradually increasing her weightbearing if she has any increased pain she should stop immediately.   I also gave her Visco heels follow-up with me in 6 weeks repeat x-rays of the calcaneus

## 2018-12-19 ENCOUNTER — OFFICE VISIT (OUTPATIENT)
Dept: ORTHOPEDIC SURGERY | Age: 63
End: 2018-12-19
Payer: COMMERCIAL

## 2018-12-19 VITALS
HEIGHT: 64 IN | BODY MASS INDEX: 20.47 KG/M2 | DIASTOLIC BLOOD PRESSURE: 74 MMHG | SYSTOLIC BLOOD PRESSURE: 124 MMHG | WEIGHT: 119.93 LBS | HEART RATE: 127 BPM

## 2018-12-19 DIAGNOSIS — M79.672 CHRONIC HEEL PAIN, LEFT: Primary | ICD-10-CM

## 2018-12-19 DIAGNOSIS — S92.032G: ICD-10-CM

## 2018-12-19 DIAGNOSIS — G89.29 CHRONIC HEEL PAIN, LEFT: Primary | ICD-10-CM

## 2018-12-19 PROCEDURE — 99212 OFFICE O/P EST SF 10 MIN: CPT | Performed by: ORTHOPAEDIC SURGERY

## 2025-02-14 ENCOUNTER — HOSPITAL ENCOUNTER (INPATIENT)
Age: 70
LOS: 4 days | Discharge: HOME HEALTH | DRG: 193 | End: 2025-02-18
Payer: COMMERCIAL

## 2025-02-14 DIAGNOSIS — R53.81 DEBILITY: Primary | ICD-10-CM

## 2025-02-14 PROBLEM — J10.1 INFLUENZA A: Status: ACTIVE | Noted: 2025-02-14

## 2025-02-14 PROCEDURE — 6370000000 HC RX 637 (ALT 250 FOR IP): Performed by: HOSPITALIST

## 2025-02-14 PROCEDURE — 6360000002 HC RX W HCPCS: Performed by: HOSPITALIST

## 2025-02-14 PROCEDURE — 2700000000 HC OXYGEN THERAPY PER DAY

## 2025-02-14 PROCEDURE — 94761 N-INVAS EAR/PLS OXIMETRY MLT: CPT

## 2025-02-14 PROCEDURE — 2060000000 HC ICU INTERMEDIATE R&B

## 2025-02-14 PROCEDURE — 2500000003 HC RX 250 WO HCPCS: Performed by: HOSPITALIST

## 2025-02-14 RX ORDER — ENOXAPARIN SODIUM 100 MG/ML
40 INJECTION SUBCUTANEOUS DAILY
Status: DISCONTINUED | OUTPATIENT
Start: 2025-02-15 | End: 2025-02-15

## 2025-02-14 RX ORDER — SODIUM CHLORIDE 0.9 % (FLUSH) 0.9 %
5-40 SYRINGE (ML) INJECTION EVERY 12 HOURS SCHEDULED
Status: DISCONTINUED | OUTPATIENT
Start: 2025-02-14 | End: 2025-02-18 | Stop reason: HOSPADM

## 2025-02-14 RX ORDER — LEVOFLOXACIN 5 MG/ML
500 INJECTION, SOLUTION INTRAVENOUS EVERY 24 HOURS
Status: DISCONTINUED | OUTPATIENT
Start: 2025-02-14 | End: 2025-02-15

## 2025-02-14 RX ORDER — IPRATROPIUM BROMIDE AND ALBUTEROL SULFATE 2.5; .5 MG/3ML; MG/3ML
1 SOLUTION RESPIRATORY (INHALATION)
Status: DISCONTINUED | OUTPATIENT
Start: 2025-02-15 | End: 2025-02-15

## 2025-02-14 RX ORDER — SODIUM CHLORIDE 0.9 % (FLUSH) 0.9 %
5-40 SYRINGE (ML) INJECTION PRN
Status: DISCONTINUED | OUTPATIENT
Start: 2025-02-14 | End: 2025-02-18 | Stop reason: HOSPADM

## 2025-02-14 RX ORDER — MAGNESIUM SULFATE IN WATER 40 MG/ML
2000 INJECTION, SOLUTION INTRAVENOUS PRN
Status: DISCONTINUED | OUTPATIENT
Start: 2025-02-14 | End: 2025-02-18 | Stop reason: HOSPADM

## 2025-02-14 RX ORDER — OSELTAMIVIR PHOSPHATE 75 MG/1
75 CAPSULE ORAL 2 TIMES DAILY
Status: DISCONTINUED | OUTPATIENT
Start: 2025-02-14 | End: 2025-02-15

## 2025-02-14 RX ORDER — PREDNISONE 20 MG/1
40 TABLET ORAL DAILY
Status: DISCONTINUED | OUTPATIENT
Start: 2025-02-14 | End: 2025-02-18 | Stop reason: HOSPADM

## 2025-02-14 RX ORDER — ONDANSETRON 4 MG/1
4 TABLET, ORALLY DISINTEGRATING ORAL EVERY 8 HOURS PRN
Status: DISCONTINUED | OUTPATIENT
Start: 2025-02-14 | End: 2025-02-18 | Stop reason: HOSPADM

## 2025-02-14 RX ORDER — ACETAMINOPHEN 650 MG/1
650 SUPPOSITORY RECTAL EVERY 6 HOURS PRN
Status: DISCONTINUED | OUTPATIENT
Start: 2025-02-14 | End: 2025-02-18 | Stop reason: HOSPADM

## 2025-02-14 RX ORDER — POTASSIUM CHLORIDE 1500 MG/1
40 TABLET, EXTENDED RELEASE ORAL PRN
Status: DISCONTINUED | OUTPATIENT
Start: 2025-02-14 | End: 2025-02-18 | Stop reason: HOSPADM

## 2025-02-14 RX ORDER — ACETAMINOPHEN 325 MG/1
650 TABLET ORAL EVERY 6 HOURS PRN
Status: DISCONTINUED | OUTPATIENT
Start: 2025-02-14 | End: 2025-02-18 | Stop reason: HOSPADM

## 2025-02-14 RX ORDER — ONDANSETRON 2 MG/ML
4 INJECTION INTRAMUSCULAR; INTRAVENOUS EVERY 6 HOURS PRN
Status: DISCONTINUED | OUTPATIENT
Start: 2025-02-14 | End: 2025-02-18 | Stop reason: HOSPADM

## 2025-02-14 RX ORDER — POTASSIUM CHLORIDE 7.45 MG/ML
10 INJECTION INTRAVENOUS PRN
Status: DISCONTINUED | OUTPATIENT
Start: 2025-02-14 | End: 2025-02-18 | Stop reason: HOSPADM

## 2025-02-14 RX ORDER — SODIUM CHLORIDE 9 MG/ML
INJECTION, SOLUTION INTRAVENOUS PRN
Status: DISCONTINUED | OUTPATIENT
Start: 2025-02-14 | End: 2025-02-18 | Stop reason: HOSPADM

## 2025-02-14 RX ORDER — POLYETHYLENE GLYCOL 3350 17 G/17G
17 POWDER, FOR SOLUTION ORAL DAILY PRN
Status: DISCONTINUED | OUTPATIENT
Start: 2025-02-14 | End: 2025-02-18 | Stop reason: HOSPADM

## 2025-02-14 RX ADMIN — LEVOFLOXACIN 500 MG: 500 INJECTION, SOLUTION INTRAVENOUS at 22:22

## 2025-02-14 RX ADMIN — OSELTAMIVIR PHOSPHATE 75 MG: 75 CAPSULE ORAL at 22:00

## 2025-02-14 RX ADMIN — Medication 10 ML: at 22:23

## 2025-02-14 RX ADMIN — PREDNISONE 40 MG: 20 TABLET ORAL at 22:00

## 2025-02-15 ENCOUNTER — APPOINTMENT (OUTPATIENT)
Dept: GENERAL RADIOLOGY | Age: 70
DRG: 193 | End: 2025-02-15
Payer: COMMERCIAL

## 2025-02-15 PROBLEM — J96.01 ACUTE HYPOXIC RESPIRATORY FAILURE (HCC): Status: ACTIVE | Noted: 2025-02-15

## 2025-02-15 LAB
AMMONIA PLAS-SCNC: 16 UMOL/L (ref 11–51)
ANION GAP SERPL CALCULATED.3IONS-SCNC: 10 MMOL/L (ref 3–16)
BASE EXCESS BLDA CALC-SCNC: 13.3 MMOL/L (ref -3–3)
BASOPHILS # BLD: 0 K/UL (ref 0–0.2)
BASOPHILS NFR BLD: 0.1 %
BUN SERPL-MCNC: 16 MG/DL (ref 7–20)
CALCIUM SERPL-MCNC: 8.5 MG/DL (ref 8.3–10.6)
CHLORIDE SERPL-SCNC: 95 MMOL/L (ref 99–110)
CO2 BLDA-SCNC: 39 MMOL/L
CO2 SERPL-SCNC: 35 MMOL/L (ref 21–32)
COHGB MFR BLDA: 0.3 % (ref 0–1.5)
CREAT SERPL-MCNC: 0.8 MG/DL (ref 0.6–1.2)
DEPRECATED RDW RBC AUTO: 14.3 % (ref 12.4–15.4)
EOSINOPHIL # BLD: 0 K/UL (ref 0–0.6)
EOSINOPHIL NFR BLD: 0 %
GFR SERPLBLD CREATININE-BSD FMLA CKD-EPI: 80 ML/MIN/{1.73_M2}
GLUCOSE SERPL-MCNC: 123 MG/DL (ref 70–99)
HCO3 BLDA-SCNC: 37.6 MMOL/L (ref 21–29)
HCT VFR BLD AUTO: 33.2 % (ref 36–48)
HGB BLD-MCNC: 11.4 G/DL (ref 12–16)
HGB BLDA-MCNC: 11.9 G/DL (ref 12–16)
INR PPP: 1.25 (ref 0.85–1.15)
LYMPHOCYTES # BLD: 0.4 K/UL (ref 1–5.1)
LYMPHOCYTES NFR BLD: 4.9 %
MAGNESIUM SERPL-MCNC: 2.06 MG/DL (ref 1.8–2.4)
MCH RBC QN AUTO: 30.9 PG (ref 26–34)
MCHC RBC AUTO-ENTMCNC: 34.3 G/DL (ref 31–36)
MCV RBC AUTO: 90 FL (ref 80–100)
METHGB MFR BLDA: 0.2 %
MONOCYTES # BLD: 0.2 K/UL (ref 0–1.3)
MONOCYTES NFR BLD: 2.3 %
NEUTROPHILS # BLD: 7.9 K/UL (ref 1.7–7.7)
NEUTROPHILS NFR BLD: 92.7 %
O2 THERAPY: ABNORMAL
PCO2 BLDA: 46.1 MMHG (ref 35–45)
PH BLDA: 7.53 [PH] (ref 7.35–7.45)
PLATELET # BLD AUTO: 310 K/UL (ref 135–450)
PMV BLD AUTO: 7.8 FL (ref 5–10.5)
PO2 BLDA: 58.7 MMHG (ref 75–108)
POTASSIUM SERPL-SCNC: 3.5 MMOL/L (ref 3.5–5.1)
PROCALCITONIN SERPL IA-MCNC: 0.13 NG/ML (ref 0–0.15)
PROTHROMBIN TIME: 15.9 SEC (ref 11.9–14.9)
RBC # BLD AUTO: 3.69 M/UL (ref 4–5.2)
SAO2 % BLDA: 90.7 %
SODIUM SERPL-SCNC: 140 MMOL/L (ref 136–145)
WBC # BLD AUTO: 8.5 K/UL (ref 4–11)

## 2025-02-15 PROCEDURE — 2060000000 HC ICU INTERMEDIATE R&B

## 2025-02-15 PROCEDURE — 36600 WITHDRAWAL OF ARTERIAL BLOOD: CPT

## 2025-02-15 PROCEDURE — 94640 AIRWAY INHALATION TREATMENT: CPT

## 2025-02-15 PROCEDURE — 85610 PROTHROMBIN TIME: CPT

## 2025-02-15 PROCEDURE — 2500000003 HC RX 250 WO HCPCS: Performed by: HOSPITALIST

## 2025-02-15 PROCEDURE — 36415 COLL VENOUS BLD VENIPUNCTURE: CPT

## 2025-02-15 PROCEDURE — 85025 COMPLETE CBC W/AUTO DIFF WBC: CPT

## 2025-02-15 PROCEDURE — 2700000000 HC OXYGEN THERAPY PER DAY

## 2025-02-15 PROCEDURE — 2580000003 HC RX 258: Performed by: HOSPITALIST

## 2025-02-15 PROCEDURE — 82803 BLOOD GASES ANY COMBINATION: CPT

## 2025-02-15 PROCEDURE — 6370000000 HC RX 637 (ALT 250 FOR IP): Performed by: STUDENT IN AN ORGANIZED HEALTH CARE EDUCATION/TRAINING PROGRAM

## 2025-02-15 PROCEDURE — 6360000002 HC RX W HCPCS: Performed by: STUDENT IN AN ORGANIZED HEALTH CARE EDUCATION/TRAINING PROGRAM

## 2025-02-15 PROCEDURE — 71045 X-RAY EXAM CHEST 1 VIEW: CPT

## 2025-02-15 PROCEDURE — 99223 1ST HOSP IP/OBS HIGH 75: CPT | Performed by: INTERNAL MEDICINE

## 2025-02-15 PROCEDURE — 84443 ASSAY THYROID STIM HORMONE: CPT

## 2025-02-15 PROCEDURE — 82140 ASSAY OF AMMONIA: CPT

## 2025-02-15 PROCEDURE — 6360000002 HC RX W HCPCS: Performed by: HOSPITALIST

## 2025-02-15 PROCEDURE — 94669 MECHANICAL CHEST WALL OSCILL: CPT

## 2025-02-15 PROCEDURE — 6370000000 HC RX 637 (ALT 250 FOR IP): Performed by: INTERNAL MEDICINE

## 2025-02-15 PROCEDURE — 94761 N-INVAS EAR/PLS OXIMETRY MLT: CPT

## 2025-02-15 PROCEDURE — 80048 BASIC METABOLIC PNL TOTAL CA: CPT

## 2025-02-15 PROCEDURE — 6360000002 HC RX W HCPCS: Performed by: INTERNAL MEDICINE

## 2025-02-15 PROCEDURE — 84145 PROCALCITONIN (PCT): CPT

## 2025-02-15 PROCEDURE — 83735 ASSAY OF MAGNESIUM: CPT

## 2025-02-15 PROCEDURE — 6370000000 HC RX 637 (ALT 250 FOR IP): Performed by: HOSPITALIST

## 2025-02-15 PROCEDURE — 92610 EVALUATE SWALLOWING FUNCTION: CPT

## 2025-02-15 RX ORDER — ATORVASTATIN CALCIUM 10 MG/1
10 TABLET, FILM COATED ORAL NIGHTLY
COMMUNITY

## 2025-02-15 RX ORDER — ALBUTEROL SULFATE 0.83 MG/ML
2.5 SOLUTION RESPIRATORY (INHALATION)
Status: DISCONTINUED | OUTPATIENT
Start: 2025-02-15 | End: 2025-02-15

## 2025-02-15 RX ORDER — OSELTAMIVIR PHOSPHATE 30 MG/1
30 CAPSULE ORAL 2 TIMES DAILY
Status: DISCONTINUED | OUTPATIENT
Start: 2025-02-15 | End: 2025-02-18 | Stop reason: HOSPADM

## 2025-02-15 RX ORDER — MONTELUKAST SODIUM 10 MG/1
10 TABLET ORAL NIGHTLY
COMMUNITY

## 2025-02-15 RX ORDER — ENOXAPARIN SODIUM 100 MG/ML
30 INJECTION SUBCUTANEOUS DAILY
Status: DISCONTINUED | OUTPATIENT
Start: 2025-02-15 | End: 2025-02-18 | Stop reason: HOSPADM

## 2025-02-15 RX ORDER — LEVOTHYROXINE SODIUM 25 UG/1
25 TABLET ORAL DAILY
COMMUNITY
Start: 2024-11-27

## 2025-02-15 RX ORDER — BUDESONIDE AND FORMOTEROL FUMARATE DIHYDRATE 160; 4.5 UG/1; UG/1
2 AEROSOL RESPIRATORY (INHALATION)
Status: DISCONTINUED | OUTPATIENT
Start: 2025-02-15 | End: 2025-02-18 | Stop reason: HOSPADM

## 2025-02-15 RX ORDER — LEVOFLOXACIN 5 MG/ML
250 INJECTION, SOLUTION INTRAVENOUS EVERY 24 HOURS
Status: DISCONTINUED | OUTPATIENT
Start: 2025-02-15 | End: 2025-02-16

## 2025-02-15 RX ORDER — ALBUTEROL SULFATE 0.83 MG/ML
2.5 SOLUTION RESPIRATORY (INHALATION)
Status: DISCONTINUED | OUTPATIENT
Start: 2025-02-15 | End: 2025-02-18 | Stop reason: HOSPADM

## 2025-02-15 RX ADMIN — ENOXAPARIN SODIUM 30 MG: 100 INJECTION SUBCUTANEOUS at 08:52

## 2025-02-15 RX ADMIN — OSELTAMIVIR 30 MG: 30 CAPSULE ORAL at 08:53

## 2025-02-15 RX ADMIN — LEVOFLOXACIN 250 MG: 250 INJECTION, SOLUTION INTRAVENOUS at 23:43

## 2025-02-15 RX ADMIN — SODIUM CHLORIDE: 0.9 INJECTION, SOLUTION INTRAVENOUS at 23:42

## 2025-02-15 RX ADMIN — Medication 10 ML: at 08:53

## 2025-02-15 RX ADMIN — ACETAMINOPHEN 650 MG: 325 TABLET ORAL at 08:53

## 2025-02-15 RX ADMIN — ALBUTEROL SULFATE 2.5 MG: 2.5 SOLUTION RESPIRATORY (INHALATION) at 08:01

## 2025-02-15 RX ADMIN — OSELTAMIVIR 30 MG: 30 CAPSULE ORAL at 20:17

## 2025-02-15 RX ADMIN — ALBUTEROL SULFATE 2.5 MG: 2.5 SOLUTION RESPIRATORY (INHALATION) at 19:58

## 2025-02-15 RX ADMIN — PREDNISONE 40 MG: 20 TABLET ORAL at 08:53

## 2025-02-15 RX ADMIN — Medication 10 ML: at 20:17

## 2025-02-15 RX ADMIN — Medication 2 PUFF: at 20:00

## 2025-02-15 RX ADMIN — ALBUTEROL SULFATE 2.5 MG: 2.5 SOLUTION RESPIRATORY (INHALATION) at 11:39

## 2025-02-15 ASSESSMENT — PAIN SCALES - GENERAL
PAINLEVEL_OUTOF10: 1
PAINLEVEL_OUTOF10: 0

## 2025-02-15 ASSESSMENT — PAIN DESCRIPTION - LOCATION: LOCATION: GENERALIZED

## 2025-02-15 NOTE — CONSULTS
PULMONARY AND CRITICAL CARE INPATIENT NOTE        Dang Jacinto   : 1955  MRN: [id number]     Admitting Physician: Kristofer Graham MD  Attending Physician: Kristofer Graham MD  PCP: Steven Villeda DO    Date of Service: 2/15/2025        ASSESSMENT & PLAN       69 y.o. female patient was admitted on 2025 with  Shortness of breath     Influenza A pneumonia  AECOPD, severe however inflation with emphysema  Acute hypoxic/hypercapnic respiratory failure  Anxiety disorder  Report of aortic ulcer on outside CT scan family.  Report and images not available for review  Pulmonary cachexia  Active smoker, 40-pack-year smoking history.  Qualifies for lung cancer screening    GERD, chronic back pain, lung nodule      Plan:              Tamiflu for 5 days  Droplet isolation  Follow-up on molecular panel.  If negative stop Levaquin  Finish 5 days of steroids  Pulmonary toilet  Acid reflux precautions  Symbicort and DuoNebs while in patient.  Continue to wean oxygen with target 90 to 94%.  Smoking cessation counseling provided.  Aspiration precautions  DVT ppx measures.  Patient will need to enroll in lung cancer screening and get updated PFTs after discharge  Request placed for obtaining outside CT scan images and report review    LOS: Hospital Day: 2     Code:Full Code        Subjective/Objective           CC/Reason for Consult:     Influenza, COPD, resp failure         HPI: February 15, 2025  69-year-old female patient with PMH of COPD, GERD, asthma, tobacco dependence, anxiety, left lower lobe pulmonary nodule following with Dr. Thomas last in 2016, tobacco dependence who was admitted on  with shortness of breath and cough.    Patient is a positive for influenza A with chest x-ray consistent with pneumonia.    Patient was afebrile and mildly tachycardic requiring 3 L of oxygen for hypoxemia  Blood gas showed elevated pCO2 with alkaline pH likely from chronic compensated hypercapnic

## 2025-02-15 NOTE — PLAN OF CARE
Problem: Discharge Planning  Goal: Discharge to home or other facility with appropriate resources  Outcome: Progressing     Problem: Chronic Conditions and Co-morbidities  Goal: Patient's chronic conditions and co-morbidity symptoms are monitored and maintained or improved  Outcome: Progressing     Problem: Nutrition Deficit:  Goal: Optimize nutritional status  Outcome: Progressing

## 2025-02-15 NOTE — H&P
B/L patchy infiltrates  EKG:  I have reviewed the EKG with the following interpretation: NSR    Physical Exam Performed:      /76   Pulse (!) 101   Temp 97.6 °F (36.4 °C) (Axillary)   Resp 20   SpO2 99%     General appearance:  No apparent distress, chronically ill-appearing, elderly, frail  HEENT:  Pupils equal, round, and reactive to light. Conjunctivae/corneas clear.  Respiratory: Coughing, bilateral rhonchi, patient on oxygen  Cardiovascular:  Regular rate and rhythm with normal S1/S2 without murmurs, rubs or gallops.  Abdomen:  Soft, non-tender, non-distended with normal bowel sounds.  Musculoskeletal:  No clubbing, cyanosis or edema bilaterally.  Full range of motion without deformity.  Neurologic:  Neurovascularly intact without any focal sensory/motor deficits. Cranial nerves: II-XII intact, grossly non-focal.  Psychiatric:  Alert and oriented, thought content appropriate, normal insight  Skin:  Skin color, texture, turgor normal.  No rashes or lesions.  Capillary Refill:  Brisk,< 3 seconds   Peripheral Pulses:  +2 palpable, equal bilaterally     Diet: [x]Adult/General  []Cardiac  []Diabetic  []Low Fat  []NPO  []NPO after Midnight  []Other   DVT Prophylaxis: [x]PPx LMWH  []SQ Heparin  []IPC/SCDs  []Eliquis  []Xarelto  []Coumadin     Code status: [x]Full  []DNR/CCA  []Limited (DNR/CCA with Do Not Intubate)  []DNRCC  Surrogate Decision Maker:   Name Home Phone Work Phone Mobile Phone Relationship Lg Grdelfino   MAGGIE HILLIARD [phone] URMILA He [phone]   Spouse         PT/OT Eval Status:   []NOT yet ordered  []Ordered and Pending   []Seen with Recommendations for:  []Home independently  []Home w/ assist  []HHC  []SNF  []Acute Rehab    Anticipated Discharge Day/Date:  2/16/24    Anticipated Discharge Location: [x]Home  []HHC  []SNF  []Acute Rehab  []ECF  []LTAC  []Hospice    Consults:      IP CONSULT TO PULMONOLOGY    I personally have obtained, updated and/or reviewed the

## 2025-02-15 NOTE — PLAN OF CARE
SLP completed evaluation. Please refer to notes in EMR.    Romana Munroe MA CF-SLP   Speech Language Pathologist

## 2025-02-16 LAB
25(OH)D3 SERPL-MCNC: 23.8 NG/ML
ANION GAP SERPL CALCULATED.3IONS-SCNC: 9 MMOL/L (ref 3–16)
BUN SERPL-MCNC: 20 MG/DL (ref 7–20)
CALCIUM SERPL-MCNC: 8.9 MG/DL (ref 8.3–10.6)
CHLORIDE SERPL-SCNC: 93 MMOL/L (ref 99–110)
CO2 SERPL-SCNC: 38 MMOL/L (ref 21–32)
CREAT SERPL-MCNC: 0.9 MG/DL (ref 0.6–1.2)
DEPRECATED RDW RBC AUTO: 14.8 % (ref 12.4–15.4)
FOLATE SERPL-MCNC: 9.72 NG/ML (ref 4.78–24.2)
GFR SERPLBLD CREATININE-BSD FMLA CKD-EPI: 70 ML/MIN/{1.73_M2}
GLUCOSE SERPL-MCNC: 85 MG/DL (ref 70–99)
HCT VFR BLD AUTO: 31.7 % (ref 36–48)
HGB BLD-MCNC: 10.9 G/DL (ref 12–16)
MAGNESIUM SERPL-MCNC: 1.97 MG/DL (ref 1.8–2.4)
MCH RBC QN AUTO: 31 PG (ref 26–34)
MCHC RBC AUTO-ENTMCNC: 34.5 G/DL (ref 31–36)
MCV RBC AUTO: 89.7 FL (ref 80–100)
PLATELET # BLD AUTO: 305 K/UL (ref 135–450)
PMV BLD AUTO: 8 FL (ref 5–10.5)
POTASSIUM SERPL-SCNC: 2.8 MMOL/L (ref 3.5–5.1)
POTASSIUM SERPL-SCNC: 4 MMOL/L (ref 3.5–5.1)
PREALB SERPL-MCNC: 17.9 MG/DL (ref 20–40)
RBC # BLD AUTO: 3.53 M/UL (ref 4–5.2)
SODIUM SERPL-SCNC: 140 MMOL/L (ref 136–145)
TSH SERPL DL<=0.005 MIU/L-ACNC: 1.54 UIU/ML (ref 0.27–4.2)
VIT B12 SERPL-MCNC: 961 PG/ML (ref 211–911)
WBC # BLD AUTO: 7.6 K/UL (ref 4–11)

## 2025-02-16 PROCEDURE — 2060000000 HC ICU INTERMEDIATE R&B

## 2025-02-16 PROCEDURE — 2700000000 HC OXYGEN THERAPY PER DAY

## 2025-02-16 PROCEDURE — 6370000000 HC RX 637 (ALT 250 FOR IP): Performed by: HOSPITALIST

## 2025-02-16 PROCEDURE — 80048 BASIC METABOLIC PNL TOTAL CA: CPT

## 2025-02-16 PROCEDURE — 6370000000 HC RX 637 (ALT 250 FOR IP): Performed by: STUDENT IN AN ORGANIZED HEALTH CARE EDUCATION/TRAINING PROGRAM

## 2025-02-16 PROCEDURE — 99233 SBSQ HOSP IP/OBS HIGH 50: CPT | Performed by: INTERNAL MEDICINE

## 2025-02-16 PROCEDURE — 82746 ASSAY OF FOLIC ACID SERUM: CPT

## 2025-02-16 PROCEDURE — 97166 OT EVAL MOD COMPLEX 45 MIN: CPT

## 2025-02-16 PROCEDURE — 83735 ASSAY OF MAGNESIUM: CPT

## 2025-02-16 PROCEDURE — 36415 COLL VENOUS BLD VENIPUNCTURE: CPT

## 2025-02-16 PROCEDURE — 2500000003 HC RX 250 WO HCPCS: Performed by: HOSPITALIST

## 2025-02-16 PROCEDURE — 97530 THERAPEUTIC ACTIVITIES: CPT

## 2025-02-16 PROCEDURE — 84134 ASSAY OF PREALBUMIN: CPT

## 2025-02-16 PROCEDURE — 97162 PT EVAL MOD COMPLEX 30 MIN: CPT

## 2025-02-16 PROCEDURE — 6360000002 HC RX W HCPCS: Performed by: HOSPITALIST

## 2025-02-16 PROCEDURE — 6360000002 HC RX W HCPCS: Performed by: INTERNAL MEDICINE

## 2025-02-16 PROCEDURE — 82306 VITAMIN D 25 HYDROXY: CPT

## 2025-02-16 PROCEDURE — 82607 VITAMIN B-12: CPT

## 2025-02-16 PROCEDURE — 94761 N-INVAS EAR/PLS OXIMETRY MLT: CPT

## 2025-02-16 PROCEDURE — 94640 AIRWAY INHALATION TREATMENT: CPT

## 2025-02-16 PROCEDURE — 6370000000 HC RX 637 (ALT 250 FOR IP): Performed by: NURSE PRACTITIONER

## 2025-02-16 PROCEDURE — 6360000002 HC RX W HCPCS: Performed by: STUDENT IN AN ORGANIZED HEALTH CARE EDUCATION/TRAINING PROGRAM

## 2025-02-16 PROCEDURE — 51798 US URINE CAPACITY MEASURE: CPT

## 2025-02-16 PROCEDURE — 85027 COMPLETE CBC AUTOMATED: CPT

## 2025-02-16 PROCEDURE — 84132 ASSAY OF SERUM POTASSIUM: CPT

## 2025-02-16 PROCEDURE — 97535 SELF CARE MNGMENT TRAINING: CPT

## 2025-02-16 RX ORDER — LEVOFLOXACIN 500 MG/1
250 TABLET, FILM COATED ORAL DAILY
Status: COMPLETED | OUTPATIENT
Start: 2025-02-16 | End: 2025-02-18

## 2025-02-16 RX ADMIN — Medication 2 PUFF: at 07:22

## 2025-02-16 RX ADMIN — POTASSIUM CHLORIDE 10 MEQ: 7.46 INJECTION, SOLUTION INTRAVENOUS at 17:43

## 2025-02-16 RX ADMIN — OSELTAMIVIR 30 MG: 30 CAPSULE ORAL at 20:17

## 2025-02-16 RX ADMIN — POTASSIUM CHLORIDE 10 MEQ: 7.46 INJECTION, SOLUTION INTRAVENOUS at 15:26

## 2025-02-16 RX ADMIN — Medication 10 ML: at 09:39

## 2025-02-16 RX ADMIN — ALBUTEROL SULFATE 2.5 MG: 2.5 SOLUTION RESPIRATORY (INHALATION) at 19:59

## 2025-02-16 RX ADMIN — POTASSIUM CHLORIDE 10 MEQ: 7.46 INJECTION, SOLUTION INTRAVENOUS at 13:08

## 2025-02-16 RX ADMIN — LEVOFLOXACIN 250 MG: 500 TABLET, FILM COATED ORAL at 15:32

## 2025-02-16 RX ADMIN — POTASSIUM CHLORIDE 10 MEQ: 7.46 INJECTION, SOLUTION INTRAVENOUS at 11:30

## 2025-02-16 RX ADMIN — Medication 10 ML: at 20:18

## 2025-02-16 RX ADMIN — POTASSIUM CHLORIDE 10 MEQ: 7.46 INJECTION, SOLUTION INTRAVENOUS at 09:38

## 2025-02-16 RX ADMIN — ENOXAPARIN SODIUM 30 MG: 100 INJECTION SUBCUTANEOUS at 09:39

## 2025-02-16 RX ADMIN — OSELTAMIVIR 30 MG: 30 CAPSULE ORAL at 09:39

## 2025-02-16 RX ADMIN — Medication 2 PUFF: at 19:59

## 2025-02-16 RX ADMIN — POTASSIUM CHLORIDE 10 MEQ: 7.46 INJECTION, SOLUTION INTRAVENOUS at 19:22

## 2025-02-16 RX ADMIN — PREDNISONE 40 MG: 20 TABLET ORAL at 09:39

## 2025-02-16 RX ADMIN — ALBUTEROL SULFATE 2.5 MG: 2.5 SOLUTION RESPIRATORY (INHALATION) at 07:22

## 2025-02-16 RX ADMIN — ALBUTEROL SULFATE 2.5 MG: 2.5 SOLUTION RESPIRATORY (INHALATION) at 12:22

## 2025-02-16 ASSESSMENT — PAIN SCALES - GENERAL
PAINLEVEL_OUTOF10: 0
PAINLEVEL_OUTOF10: 0

## 2025-02-16 NOTE — PLAN OF CARE
Problem: Discharge Planning  Goal: Discharge to home or other facility with appropriate resources  Outcome: Progressing     Problem: Chronic Conditions and Co-morbidities  Goal: Patient's chronic conditions and co-morbidity symptoms are monitored and maintained or improved  Outcome: Progressing     Problem: Nutrition Deficit:  Goal: Optimize nutritional status  Outcome: Progressing     Problem: Pain  Goal: Verbalizes/displays adequate comfort level or baseline comfort level  Outcome: Progressing     Problem: Safety - Adult  Goal: Free from fall injury  Outcome: Progressing     Problem: Skin/Tissue Integrity  Goal: Skin integrity remains intact  Description: 1.  Monitor for areas of redness and/or skin breakdown  2.  Assess vascular access sites hourly  3.  Every 4-6 hours minimum:  Change oxygen saturation probe site  4.  Every 4-6 hours:  If on nasal continuous positive airway pressure, respiratory therapy assess nares and determine need for appliance change or resting period  Outcome: Progressing

## 2025-02-17 PROBLEM — E43 SEVERE PROTEIN-CALORIE MALNUTRITION: Status: ACTIVE | Noted: 2025-02-17

## 2025-02-17 LAB
ANION GAP SERPL CALCULATED.3IONS-SCNC: 11 MMOL/L (ref 3–16)
BUN SERPL-MCNC: 18 MG/DL (ref 7–20)
CALCIUM SERPL-MCNC: 9.2 MG/DL (ref 8.3–10.6)
CHLORIDE SERPL-SCNC: 96 MMOL/L (ref 99–110)
CO2 SERPL-SCNC: 32 MMOL/L (ref 21–32)
CREAT SERPL-MCNC: 0.9 MG/DL (ref 0.6–1.2)
DEPRECATED RDW RBC AUTO: 15.2 % (ref 12.4–15.4)
GFR SERPLBLD CREATININE-BSD FMLA CKD-EPI: 70 ML/MIN/{1.73_M2}
GLUCOSE SERPL-MCNC: 77 MG/DL (ref 70–99)
HCT VFR BLD AUTO: 38.1 % (ref 36–48)
HGB BLD-MCNC: 12.9 G/DL (ref 12–16)
MCH RBC QN AUTO: 31.2 PG (ref 26–34)
MCHC RBC AUTO-ENTMCNC: 33.7 G/DL (ref 31–36)
MCV RBC AUTO: 92.4 FL (ref 80–100)
PLATELET # BLD AUTO: 297 K/UL (ref 135–450)
PMV BLD AUTO: 7.7 FL (ref 5–10.5)
POTASSIUM SERPL-SCNC: 3.8 MMOL/L (ref 3.5–5.1)
RBC # BLD AUTO: 4.13 M/UL (ref 4–5.2)
SODIUM SERPL-SCNC: 139 MMOL/L (ref 136–145)
WBC # BLD AUTO: 8.1 K/UL (ref 4–11)

## 2025-02-17 PROCEDURE — 94761 N-INVAS EAR/PLS OXIMETRY MLT: CPT

## 2025-02-17 PROCEDURE — 6370000000 HC RX 637 (ALT 250 FOR IP): Performed by: STUDENT IN AN ORGANIZED HEALTH CARE EDUCATION/TRAINING PROGRAM

## 2025-02-17 PROCEDURE — 6360000002 HC RX W HCPCS: Performed by: STUDENT IN AN ORGANIZED HEALTH CARE EDUCATION/TRAINING PROGRAM

## 2025-02-17 PROCEDURE — 36415 COLL VENOUS BLD VENIPUNCTURE: CPT

## 2025-02-17 PROCEDURE — 2500000003 HC RX 250 WO HCPCS: Performed by: HOSPITALIST

## 2025-02-17 PROCEDURE — 6370000000 HC RX 637 (ALT 250 FOR IP): Performed by: NURSE PRACTITIONER

## 2025-02-17 PROCEDURE — 94669 MECHANICAL CHEST WALL OSCILL: CPT

## 2025-02-17 PROCEDURE — 85027 COMPLETE CBC AUTOMATED: CPT

## 2025-02-17 PROCEDURE — 80048 BASIC METABOLIC PNL TOTAL CA: CPT

## 2025-02-17 PROCEDURE — 2700000000 HC OXYGEN THERAPY PER DAY

## 2025-02-17 PROCEDURE — 6370000000 HC RX 637 (ALT 250 FOR IP): Performed by: HOSPITALIST

## 2025-02-17 PROCEDURE — 94640 AIRWAY INHALATION TREATMENT: CPT

## 2025-02-17 PROCEDURE — 2060000000 HC ICU INTERMEDIATE R&B

## 2025-02-17 PROCEDURE — 6360000002 HC RX W HCPCS: Performed by: INTERNAL MEDICINE

## 2025-02-17 RX ORDER — OSELTAMIVIR PHOSPHATE 30 MG/1
30 CAPSULE ORAL 2 TIMES DAILY
Qty: 3 CAPSULE | Refills: 0 | Status: SHIPPED | OUTPATIENT
Start: 2025-02-17 | End: 2025-02-19

## 2025-02-17 RX ORDER — BUDESONIDE AND FORMOTEROL FUMARATE DIHYDRATE 160; 4.5 UG/1; UG/1
2 AEROSOL RESPIRATORY (INHALATION)
Qty: 10.2 G | Refills: 3 | Status: SHIPPED | OUTPATIENT
Start: 2025-02-17

## 2025-02-17 RX ORDER — PREDNISONE 10 MG/1
TABLET ORAL
Qty: 30 TABLET | Refills: 0 | Status: SHIPPED | OUTPATIENT
Start: 2025-02-18

## 2025-02-17 RX ADMIN — LEVOFLOXACIN 250 MG: 500 TABLET, FILM COATED ORAL at 08:25

## 2025-02-17 RX ADMIN — ALBUTEROL SULFATE 2.5 MG: 2.5 SOLUTION RESPIRATORY (INHALATION) at 14:08

## 2025-02-17 RX ADMIN — Medication 10 ML: at 08:26

## 2025-02-17 RX ADMIN — OSELTAMIVIR 30 MG: 30 CAPSULE ORAL at 20:00

## 2025-02-17 RX ADMIN — PREDNISONE 40 MG: 20 TABLET ORAL at 08:25

## 2025-02-17 RX ADMIN — Medication 2 PUFF: at 08:15

## 2025-02-17 RX ADMIN — OSELTAMIVIR 30 MG: 30 CAPSULE ORAL at 08:25

## 2025-02-17 RX ADMIN — ALBUTEROL SULFATE 2.5 MG: 2.5 SOLUTION RESPIRATORY (INHALATION) at 08:15

## 2025-02-17 RX ADMIN — ENOXAPARIN SODIUM 30 MG: 100 INJECTION SUBCUTANEOUS at 08:26

## 2025-02-17 NOTE — PLAN OF CARE
Problem: Discharge Planning  Goal: Discharge to home or other facility with appropriate resources  Outcome: Progressing  Flowsheets (Taken 2/16/2025 0955 by Lupe Shabazz, RN)  Discharge to home or other facility with appropriate resources: Identify barriers to discharge with patient and caregiver     Problem: Chronic Conditions and Co-morbidities  Goal: Patient's chronic conditions and co-morbidity symptoms are monitored and maintained or improved  Outcome: Progressing  Flowsheets (Taken 2/16/2025 0955 by Shabazz, Lupe A, RN)  Care Plan - Patient's Chronic Conditions and Co-Morbidity Symptoms are Monitored and Maintained or Improved: Monitor and assess patient's chronic conditions and comorbid symptoms for stability, deterioration, or improvement     Problem: Nutrition Deficit:  Goal: Optimize nutritional status  Outcome: Progressing     Problem: Pain  Goal: Verbalizes/displays adequate comfort level or baseline comfort level  Outcome: Progressing     Problem: Safety - Adult  Goal: Free from fall injury  Outcome: Progressing     Problem: Skin/Tissue Integrity  Goal: Skin integrity remains intact  Description: 1.  Monitor for areas of redness and/or skin breakdown  2.  Assess vascular access sites hourly  3.  Every 4-6 hours minimum:  Change oxygen saturation probe site  4.  Every 4-6 hours:  If on nasal continuous positive airway pressure, respiratory therapy assess nares and determine need for appliance change or resting period  Outcome: Progressing  Flowsheets (Taken 2/16/2025 0955 by Lupe Shabazz RN)  Skin Integrity Remains Intact: Monitor for areas of redness and/or skin breakdown

## 2025-02-17 NOTE — CARE COORDINATION
CM exhausted 7 HHC resources, they all denied the insurance for HHC.   Pt wnet home with O2 needs taken care of per [facility].

## 2025-02-17 NOTE — DISCHARGE SUMMARY
Hospital Medicine Discharge Summary    Patient: Dang Jacinto   : 1955     Hospital:  [facility]  Admit Date: 2025   Discharge Date:   25  Disposition:  [x]Home   []HHC  []SNF  []Acute Rehab  []LTAC  []Hospice  Code status:  [x]Full  []DNR/CCA  []Limited (DNR/CCA with Do Not Intubate)  []DNRCC  Condition at Discharge: Stable  Primary Care Provider: Severn, Amber J, APRN - CNP    Admitting Provider: No admitting provider for patient encounter.  Discharge Provider: CLAUDIA Bermeo CNP     Discharge Diagnoses:      Active Hospital Problems    Diagnosis     Acute hypoxic respiratory failure [J96.01]     Influenza A [J10.1]      Subjective:  EMR and notes reviewed pt seen and examined,lying in bed alert, no complaints.   Presenting Admission History:        \" 69 y.o. female with history of active smoking, asthma/COPD, who is not on home oxygen transferred from outside facility for worsening shortness of breath and coughing.  She states she has been sick for the past week with increasing cough and shortness of breath.  As stated she continues to smoke.  She has been using her breathing treatments without improvement over the last several days.  She subsequently went to outside facility and was found to have influenza A and infiltrates on CXR.  She was also found to be hypoxic and improved with supplemental oxygen.  Patient has been transferred to [facility] for further evaluation.\"            Assessment/Plan:       Current Principal Problem:  Influenza A     Acute Resp Failure with Hypoxia in the setting of COPE emphysema exacerbated by Influenza A   - CXR on arrival reviewed IMPRESSION:  1. Emphysema.  2. Question slight consolidation lateral right lung base.  - cont supportive care was started on Tamiflu, IHBD, steroids and Levaquin  - PVR pending , supplemental 02, wean to keep sats > 88%   - Pulm claritza  - Pulm was consulted, notes reviewed, Rec OP follow up

## 2025-02-17 NOTE — CARE COORDINATION
BARBARA has been working on finding [facility] that accepts the pt's insurance and location . No results yet.    BARBARA notified Morgan from [facility] about O2 home needs/orders.

## 2025-02-17 NOTE — DISCHARGE INSTR - COC
Continuity of Care Form    Patient Name: Dang Jacinto   :  1955  MRN:  [id number]    Admit date:  2025  Discharge date:      Code Status Order: Full Code   Advance Directives:   Advance Care Flowsheet Documentation             Admitting Physician:  No admitting provider for patient encounter.  PCP: Severn, Amber J, APRN - CNP    Discharging Nurse: Donna  Discharging Hospital Unit/Room#: 0430/0430-01  Discharging Unit Phone Number: [phone]    Emergency Contact:   Extended Emergency Contact Information  Primary Emergency Contact: Tracey Paz  Home Phone: [phone]  Mobile Phone: [phone]  Relation: Child  Secondary Emergency Contact: Willem Jacinto  Address: [address]  Home Phone: [phone]  Relation: Spouse    Past Surgical History:  Past Surgical History:   Procedure Laterality Date    CHOLECYSTECTOMY  2016    with grams    HYSTERECTOMY      PArtial    OPEN TX TRIMALLEOLAR ANKLE FX W FIX PST LIP Left 2018    OPEN REDUCTION INTERNAL FIXATION LEFT CALCANEUS FRACTURE performed by Edgard Rodriguez MD at [facility] OR    TONSILLECTOMY         Immunization History:   Immunization History   Administered Date(s) Administered    Pneumococcal, PPSV23, PNEUMOVAX 23, (age 2y+), SC/IM, 0.5mL 2014       Active Problems:  Patient Active Problem List   Diagnosis Code    COPD (chronic obstructive pulmonary disease) ([facility]) J44.9    Reflux esophagitis K21.00    Chronic low back pain M54.50, G89.29    Asthma J45.909    Right knee pain M25.561    Tobacco use Z72.0    Lumbar stenosis M48.061    Lumbar facet arthropathy M47.816    Degeneration of lumbar or lumbosacral intervertebral disc M51.379    Nodule of left lung R91.1    Gallstones K80.20    Abdominal pain R10.9    Closed displaced avulsion fracture of tuberosity of left calcaneus S92.032A    Influenza A J10.1    Acute hypoxic respiratory failure J96.01

## 2025-02-17 NOTE — CONSULTS
Comprehensive Nutrition Assessment    Type and Reason for Visit:  Initial, Consult    Nutrition Recommendations/Plan:   Continue regular diet.  Encourage small, frequent high protein meals (eggs, cheese, yogurt, meat, fish, nuts, ONS).  Ensure BID.  Monitor pertinent labs, bowel habits, weight, N/V, supplement acceptance, clinical progression.        Malnutrition Assessment:  Malnutrition Status:  Severe malnutrition (02/17/25 1539)    Context:  Chronic Illness     Findings of the 6 clinical characteristics of malnutrition:  Energy Intake:  75% or less estimated energy requirements for 1 month or longer  Weight Loss:  Mild weight loss     Body Fat Loss:  Severe body fat loss Triceps, Orbital   Muscle Mass Loss:  Severe muscle mass loss Temples (temporalis), Clavicles (pectoralis & deltoids)  Fluid Accumulation:  No fluid accumulation     Strength:  Not Performed    Nutrition Assessment:    Consulted for unintentional weight loss, positive nutrition screen and BMI of 13.28. Admitted for Acute Resp Failure with Hypoxia in the setting of COPE emphysema exacerbated by Influenza A. PMHx of active smoking, asthma/COPD. Nutritionally compromised AEB fat/muscle wasting, poor appetite. Patient reports poor PO intakes for a \"while.\" Consuming 0-50% of all meals per EMR. Admits to unintentional weight loss. Unsure of UBW. Limited weight history per EMR. Will send Ensure BID to better meet nutrient needs. No issues chewing or swallowing. No N/V at this time. Encouraged small, frequent high protein meals. Pt likely to discharge home soon. Will continue to monitor.    Nutrition Related Findings:    Labs reviewed. LBM 2/17. Wound Type: None       Current Nutrition Intake & Therapies:    Average Meal Intake: 1-25%, 0%, 26-50%  Average Supplements Intake: None Ordered  ADULT DIET; Regular    Anthropometric Measures:  Height: 162.6 cm (5' 4\")  Ideal Body Weight (IBW): 120 lbs (55 kg)       Current Body Weight: 35.1 kg (77 lb 6.1

## 2025-02-18 VITALS
SYSTOLIC BLOOD PRESSURE: 155 MMHG | TEMPERATURE: 98.1 F | WEIGHT: 77.38 LBS | BODY MASS INDEX: 13.21 KG/M2 | DIASTOLIC BLOOD PRESSURE: 86 MMHG | HEART RATE: 80 BPM | HEIGHT: 64 IN | OXYGEN SATURATION: 98 % | RESPIRATION RATE: 18 BRPM

## 2025-02-18 PROCEDURE — 6370000000 HC RX 637 (ALT 250 FOR IP): Performed by: STUDENT IN AN ORGANIZED HEALTH CARE EDUCATION/TRAINING PROGRAM

## 2025-02-18 PROCEDURE — 6370000000 HC RX 637 (ALT 250 FOR IP): Performed by: HOSPITALIST

## 2025-02-18 PROCEDURE — 6370000000 HC RX 637 (ALT 250 FOR IP): Performed by: NURSE PRACTITIONER

## 2025-02-18 PROCEDURE — 6360000002 HC RX W HCPCS: Performed by: STUDENT IN AN ORGANIZED HEALTH CARE EDUCATION/TRAINING PROGRAM

## 2025-02-18 RX ADMIN — ENOXAPARIN SODIUM 30 MG: 100 INJECTION SUBCUTANEOUS at 09:09

## 2025-02-18 RX ADMIN — PREDNISONE 40 MG: 20 TABLET ORAL at 09:09

## 2025-02-18 RX ADMIN — LEVOFLOXACIN 250 MG: 500 TABLET, FILM COATED ORAL at 09:08

## 2025-02-18 RX ADMIN — OSELTAMIVIR 30 MG: 30 CAPSULE ORAL at 09:12

## 2025-02-18 NOTE — PROGRESS NOTES
Hospital Medicine Progress Note  V 1.6      Date of Admission: 2/14/2025    Hospital Day: 2      Chief Admission Complaint:  Cough weakness and blue around the mouth     Subjective:  EMR and notes reviewed pt seen and examined, sitting up in bed eating a doughnut and family is at bedside. She is pleasant and in NAD. Mild confusion and not back to baseline, Nursing and RT reported earlier that she had some increased confusion and was staring out the window, ABG and ammonia obtained earlier today and reviewed     Family at bedside was asking about abnormalities on OSH imaging, explained unable to view documents and will ask staff to attempt to obtain disks and notes from OSH     Presenting Admission History:       \" 69 y.o. female with history of active smoking, asthma/COPD, who is not on home oxygen transferred from outside facility for worsening shortness of breath and coughing.  She states she has been sick for the past week with increasing cough and shortness of breath.  As stated she continues to smoke.  She has been using her breathing treatments without improvement over the last several days.  She subsequently went to outside facility and was found to have influenza A and infiltrates on CXR.  She was also found to be hypoxic and improved with supplemental oxygen.  Patient has been transferred to [facility] for further evaluation.\"         Assessment/Plan:      Current Principal Problem:  Influenza A    Acute Resp Failure with Hypoxia in the setting of COPE emphysema exacerbated by Influenza A   - CXR on arrival reviewed IMPRESSION:  1. Emphysema.  2. Question slight consolidation lateral right lung base.  - cont supportive care was started on Tamiflu, IHBD, steroids and Levaquin  - PVR pending , supplemental 02, wean to keep sats > 88%   - Pulquang jerry  - Pulm was consulted, notes reviewed, Rec OP follow up as does not have a Pulm provider   - 2/15 easily dyspnic, does not move much air  
  Hospital Medicine Progress Note  V 1.6      Date of Admission: 2/14/2025    Hospital Day: 3      Chief Admission Complaint:  Cough weakness and blue around the mouth     Subjective:  EMR and notes reviewed pt seen and examined,lying in bed alert, no complaints.   Presenting Admission History:       \" 69 y.o. female with history of active smoking, asthma/COPD, who is not on home oxygen transferred from outside facility for worsening shortness of breath and coughing.  She states she has been sick for the past week with increasing cough and shortness of breath.  As stated she continues to smoke.  She has been using her breathing treatments without improvement over the last several days.  She subsequently went to outside facility and was found to have influenza A and infiltrates on CXR.  She was also found to be hypoxic and improved with supplemental oxygen.  Patient has been transferred to [facility] for further evaluation.\"         Assessment/Plan:      Current Principal Problem:  Influenza A    Acute Resp Failure with Hypoxia in the setting of COPE emphysema exacerbated by Influenza A   - CXR on arrival reviewed IMPRESSION:  1. Emphysema.  2. Question slight consolidation lateral right lung base.  - cont supportive care was started on Tamiflu, IHBD, steroids and Levaquin  - PVR pending , supplemental 02, wean to keep sats > 88%   - Pulm claritza  - Pulm was consulted, notes reviewed, Rec OP follow up as does not have a Pulm provider   - 2/15 easily dyspnic, does not move much air    - 2/16 on betweene 2- 4 liters suspect she may need home O2, will eval     Metabolic encephalopathy confusion:   - suspect in the setting of acute illness, possible hypoxia or that she received ativan prior to transfer.   - no sig issues at home at baseline discussed with family,   - safety   - no further issues as of 2/16     GERD: PPI     Malnutrition; suspect protein nathen malnutrition severe  - cachexia likely related to 
  Speech Language Pathology  Clinical Bedside Swallow Assessment  Facility/Department: [facility] C4 PCU        Recommendations:  Diet recommendation: IDDSI 7 Regular Solids (downgrade to soft and bite-sized if observed / reported difficulty); IDDSI 0 Thin Liquids; Meds whole with thin liquids  Instrumentation: Not clinically indicated at this time  Risk management: upright for all intake, small bites/sips, oral care 2-3x/day to reduce adverse affects in the event of aspiration, increase physical mobility as able, alternate bites/sips, slow rate of intake, general aspiration precautions, and hold PO and contact SLP if s/s of aspiration or worsening respiratory status develop.      NAME:Dang Jacinto  : 1955 (69 y.o.)   MRN: [id number]  ROOM: [address] [place] [address]  ADMISSION DATE: 2025  PATIENT DIAGNOSIS(ES): Influenza A [J10.1]  No chief complaint on file.    Patient Active Problem List    Diagnosis Date Noted    Influenza A 2025    Closed displaced avulsion fracture of tuberosity of left calcaneus 08/15/2018    Abdominal pain     Gallstones 2016    Nodule of left lung 10/05/2015    Lumbar stenosis 2014    Lumbar facet arthropathy 2014    Degeneration of lumbar or lumbosacral intervertebral disc 2014    COPD (chronic obstructive pulmonary disease) (HCC)     Reflux esophagitis     Chronic low back pain     Asthma     Right knee pain     Tobacco use      Past Medical History:   Diagnosis Date    Anxiety     Asthma     Chronic low back pain     COPD (chronic obstructive pulmonary disease) (HCC)     Reflux esophagitis     Right knee pain     Tobacco use      Past Surgical History:   Procedure Laterality Date    CHOLECYSTECTOMY  2016    with grams    HYSTERECTOMY      PArtial    OPEN TX TRIMALLEOLAR ANKLE FX W FIX PST LIP Left 2018    OPEN REDUCTION INTERNAL FIXATION LEFT CALCANEUS FRACTURE performed by Edgard Rodriguez MD at [facility] OR    TONSILLECTOMY       Allergies   Allergen 
 Latest Reference Range & Units 02/15/25 12:12   O2 Therapy  Unknown   Hemoglobin, Art, Extended 12.0 - 16.0 g/dL 11.9 (L)   pH, Arterial 7.350 - 7.450  7.529 (H)   pCO2, Arterial 35.0 - 45.0 mmHg 46.1 (H)   pO2, Arterial 75.0 - 108.0 mmHg 58.7 (L)   HCO3, Arterial 21.0 - 29.0 mmol/L 37.6 (H)   TCO2 (calc), Art Not Established mmol/L 39.0   Base Excess, Arterial -3.0 - 3.0 mmol/L 13.3 (H)   O2 Sat, Arterial >92 % 90.7 (L)   Methemoglobin, Arterial <1.5 % 0.2   Carboxyhgb, Arterial 0.0 - 1.5 % 0.3   (L): Data is abnormally low  (H): Data is abnormally high  
4 Eyes Skin Assessment     NAME:  Dang Jacinto  YOB: 1955  MEDICAL RECORD NUMBER:  [id number]    The patient is being assessed for  Admission    I agree that at least one RN has performed a thorough Head to Toe Skin Assessment on the patient. ALL assessment sites listed below have been assessed.      Areas assessed by both nurses:    Head, Face, Ears, Shoulders, Back, Chest, Arms, Elbows, Hands, Sacrum. Buttock, Coccyx, Ischium, Legs. Feet and Heels, and Under Medical Devices         Does the Patient have a Wound? Yes wound(s) were present on assessment. LDA wound assessment was Initiated and completed by RN       Jaylen Prevention initiated by RN: Yes  Wound Care Orders initiated by RN: Yes    Pressure Injury (Stage 3,4, Unstageable, DTI, NWPT, and Complex wounds) if present, place Wound referral order by RN under : No    New Ostomies, if present place, Ostomy referral order under : No     Nurse 1 eSignature: Electronically signed by Thi Tobin RN on 2/14/25 at 10:42 PM EST    **SHARE this note so that the co-signing nurse can place an eSignature**    Nurse 2 eSignature: Electronically signed by Avani Sneed RN on 2/15/25 at 2:10 AM EST   
ABG ordered due to confusion.    02/15/25 1212   Oxygen Therapy/Pulse Ox   O2 Device Nasal cannula   O2 Flow Rate (L/min) 2 L/min   Blood Gas  Performed? Yes   $ABG $Arterial Puncture   Loco's Test #1 Pos   Site #1 Left Radial   Site Prepped #1 Yes   Number of Attempts #1 2   Pressure Held #1 Yes   Complications #1 None   Post-procedure #1 Standard   Specimen Status #1 To lab   How Tolerated? Tolerated well       
FC removed per protocol   
Family attempted to contact someone at the [facility], which patient came from. They wanted to have her CT scan results and any other imaging that might have been obtained during her visit, sent to [facility]. They were told this will not be possible until Monday.   
O2 study completed. Supplemental oxygen was removed, and patient was placed on room air. She desatted to 84%. Placed back on 4L nc. O2 back up to 92%  
Occupational Therapy  [address]  Occupational Therapy Initial Assessment    Name: Dang Jacinto  : 1955  MRN: [id number]  Date of Service: 2025    Discharge Recommendations:  24 hour supervision or assist, Home with Home health OT  OT Equipment Recommendations  Equipment Needed: Yes  Mobility Devices: ADL Assistive Devices  ADL Assistive Devices: Shower Chair with back       Patient Diagnosis(es): Influenza A  Past Medical History:  has a past medical history of Anxiety, Asthma, Chronic low back pain, COPD (chronic obstructive pulmonary disease) (HCC), Reflux esophagitis, Right knee pain, and Tobacco use.  Past Surgical History:  has a past surgical history that includes Hysterectomy (); Tonsillectomy; Cholecystectomy (2016); and pr open tx trimalleolar ankle fx w/fixj pst lip (Left, 2018).           Assessment  Performance deficits / Impairments: Decreased functional mobility ;Decreased endurance;Decreased coordination;Decreased ADL status;Decreased posture;Decreased balance;Decreased strength;Decreased high-level IADLs;Decreased safe awareness  Assessment: Patient received in room and agreeable to initial evaluation/treatment session. Patient presents to the hospital with influenza A causing increased weakness and difficulty caring for herself. Patient is typically independent with ADL tasks. Patient completed LB dressing with SBA and extra time, ambulation completed with CGA. Patient appears to be near her baseline however fatigues quicker than usual. OT reviewed energy conservation techniques for ADLs. Patient will benefit from continued OT services at home upon discharge to address strength, mobility, coordination, activity tolerance, and balance in order to increase their independence and safety with ADL and IADL task completion.    Prognosis: Good  Decision Making: Medium Complexity  REQUIRES OT FOLLOW-UP: Yes  Activity Tolerance  Activity Tolerance: Patient 
Oxygen documentation:    O2 saturation at REST on ROOM AIR = __84 ____%    If saturation is 89% or above please proceed with steps 2 and 3……….    O2 saturation with AMBULATION of _____ feet on ROOM AIR = _____%  O2 saturation with AMBULATION on _______ liter/min = ______%    DCP notified: ______    
PULMONARY AND CRITICAL CARE INPATIENT NOTE        Dang Jacinto   : 1955  MRN: [id number]     Admitting Physician: Judy Ojeda DO  Attending Physician: Judy Ojeda DO  PCP: Severn, Amber J, CLAUDIA - CNP    Date of Service: 2025        ASSESSMENT & PLAN       69 y.o. female patient was admitted on 2025 with  Shortness of breath     Influenza A pneumonia  AECOPD, severe however inflation with emphysema  Acute hypoxic/hypercapnic respiratory failure  Anxiety disorder  Report of aortic ulcer on outside CT scan family.  Report and images not available for review  Pulmonary cachexia  Active smoker, 40-pack-year smoking history.  Qualifies for lung cancer screening    GERD, chronic back pain, lung nodule      Plan:              Tamiflu for 5 days  Droplet isolation  Follow-up on molecular panel.  If negative stop Levaquin  Finish 5 days of steroids  Pulmonary toilet  Potassium replacement  Acid reflux precautions  Symbicort and DuoNebs while in patient.  Continue to wean oxygen with target 90 to 94%.  Smoking cessation counseling provided.  Aspiration precautions  DVT ppx measures.  Patient will need to enroll in lung cancer screening and get updated PFTs after discharge  Request placed for obtaining outside CT scan images and report review     LOS: Hospital Day: 3     Code:Full Code        Subjective/Objective           CC/Reason for Consult:     Influenza, COPD, resp failure         HPI: February 15, 2025  69-year-old female patient with PMH of COPD, GERD, asthma, tobacco dependence, anxiety, left lower lobe pulmonary nodule following with Dr. Thomas last in 2016, tobacco dependence who was admitted on  with shortness of breath and cough.    Patient is a positive for influenza A with chest x-ray consistent with pneumonia.    Patient was afebrile and mildly tachycardic requiring 3 L of oxygen for hypoxemia  Blood gas showed elevated pCO2 with alkaline pH likely from chronic 
Patient admitted to room 430 from [facility].  Patient oriented to room, call light, bed rails, phone, lights and bathroom.  Patient instructed about the schedule of the day including: vital sign frequency, lab draws, possible tests, frequency of MD and staff rounds, including RN/MD rounding together at bedside, daily weights, and I &O's.  Patient instructed about prescribed diet, how to use 8MENU, and television. bed alarm in place, patient aware of placement and reason.  Telemetry box in place, patient aware of placement and reason.  Bed locked, in lowest position, side rails up 2/4, call light within reach.     
Per patient, family was supposed to come pick her up for discharge today after work. Family has not arrived yet. Emergency contact for pt's daughter & ex- are disconnected. Spoke with patient to get phone number for pt's son-in-law, Rakesh. Per Rakesh, they didn't know patient was being discharged today, and there was a miscommunication. Unfortunately, they live 2 hours away, and they cannot come get her tonight. They will come first thing in the morning.   
Pt okay to discharge per Dr. Guillory, pulmonologist, as long as patient and hospitalist are in agreement.   
Neuromuscular re-education, Home exercise program  Safety Devices  Type of Devices: All fall risk precautions in place, Patient at risk for falls, Call light within reach, Chair alarm in place, Left in chair, Nurse notified  Restraints  Restraints Initially in Place: No    Restrictions  Restrictions/Precautions  Restrictions/Precautions: General Precautions, Isolation  Position Activity Restriction  Other Position/Activity Restrictions: IV; O2     Subjective  General  Patient assessed for rehabilitation services?: Yes  Referring Practitioner: Caro Infante APRN - CNP  Referral Date : 02/15/25  Diagnosis: Flu, COPD         Social/Functional History  Social/Functional History  Lives With: Daughter, Family (has 24/7 assist if needed)  Type of Home: House  Home Layout: One level  Home Access: Level entry  Bathroom Shower/Tub: Walk-in shower  Bathroom Equipment: Grab bars in shower, Grab bars around toilet  Home Equipment: None  Has the patient had two or more falls in the past year or any fall with injury in the past year?: No  Prior Level of Assist for ADLs: Independent  Prior Level of Assist for Homemaking: Independent  Prior Level of Assist for Ambulation: Independent household ambulator, with or without device  Prior Level of Assist for Transfers: Independent  Active : No  Patient's  Info: daughter  Occupation: Retired  Leisure & Hobbies: play with dog  Vision/Hearing  Vision  Vision: Impaired  Vision Exceptions: Wears glasses at all times  Hearing  Hearing: Within functional limits    Cognition   Orientation  Overall Orientation Status: Within Normal Limits  Orientation Level: Oriented X4  Cognition  Overall Cognitive Status: WNL  Cognition Comment: increased processing time    Objective  Temp: 97.4 °F (36.3 °C)  Pulse: 96  Heart Rate Source: Monitor  Respirations: 22  SpO2: 99 %  O2 Device: Nasal cannula  BP: (!) 146/84  MAP (Calculated): 105  BP Location: Right upper arm  BP Method:

## 2025-02-18 NOTE — PLAN OF CARE
Patient discharge completed. Discharge information included information on diagnosis including signs and symptoms, complications and when to seek medical attention. Information on new medications also provided included use for the medication, side effects and when to call the doctor. Patient verbalized understanding of all discharge information. Patient escorted out by staff with all documented belongings. Home with family.  Both home oxygen tanks transported with pt

## 2025-02-18 NOTE — ADT AUTH CERT
Diagnosis: Influenza A and DX codes: J10.1,Acute hypoxic respiratory failure and DX codes: J96.01

## 2025-03-16 PROBLEM — J10.1 INFLUENZA A: Status: RESOLVED | Noted: 2025-02-14 | Resolved: 2025-03-16

## 2025-04-07 NOTE — PROGRESS NOTES
Dang Jacinto is a 69 y.o. who comes in today for Follow-Up from Hospital   She was seen in the hospital by Dr. Sharma dc'ed 2/17/25 with influenza A, COPD exacerbation.  She quit smoking since being in the hospital 2/2025.   She continues oxygen therapy.  She continues Symibocrt, Singulair,  Spiriva, and using albuterol rarely. She states her breathing is doing much better. She is here with her daughter.     Hospital Course:   Current Principal Problem:  Influenza A     Acute Resp Failure with Hypoxia in the setting of COPE emphysema exacerbated by Influenza A   - CXR on arrival reviewed IMPRESSION:  1. Emphysema.  2. Question slight consolidation lateral right lung base.  - cont supportive care was started on Tamiflu, IHBD, steroids and Levaquin  - PVR pending , supplemental 02, wean to keep sats > 88%   - Pulm claritza  - Pulm was consulted, notes reviewed, Rec OP follow up as does not have a Pulm provider   - 2/15 easily dyspnic, does not move much air    - 2/16 on betweene 2- 4 liters suspect she may need home O2, will eval   - 2/17 doing well on 2 liter, DC to home steroid taper, home O2. OP F/u woth Pulm. Complete tamiflu course, PPI for GI PPX      Metabolic encephalopathy confusion:   - suspect in the setting of acute illness, possible hypoxia or that she received ativan prior to transfer.   - no sig issues at home at baseline discussed with family,   - safety   - no further issues as of 2/16      GERD: PPI      Malnutrition; suspect protein nathen malnutrition severe  - cachexia likely related to COPD   - pre albumin, Vit D , b 12 and folate   - SLP and dietary consult   - weight is 75# 12.4 bmi   - rec cont supplements as OP      Gen Debility:   - safety and PT/OT - rec for HHC order placed   DM working on HHC in her [place]un[place] - difficulty avail       Imagining concerns: unable to view records will attempt to review and discuss with family   - was able to see packet and disk in packet- no CD rom to eval

## 2025-04-08 ENCOUNTER — OFFICE VISIT (OUTPATIENT)
Dept: PULMONOLOGY | Age: 70
End: 2025-04-08
Payer: COMMERCIAL

## 2025-04-08 VITALS
HEART RATE: 90 BPM | OXYGEN SATURATION: 98 % | WEIGHT: 82 LBS | HEIGHT: 64 IN | DIASTOLIC BLOOD PRESSURE: 100 MMHG | SYSTOLIC BLOOD PRESSURE: 152 MMHG | RESPIRATION RATE: 16 BRPM | TEMPERATURE: 97.8 F | BODY MASS INDEX: 14 KG/M2

## 2025-04-08 DIAGNOSIS — Z87.891 PERSONAL HISTORY OF TOBACCO USE: ICD-10-CM

## 2025-04-08 DIAGNOSIS — J44.9 PULMONARY CACHEXIA DUE TO CHRONIC OBSTRUCTIVE PULMONARY DISEASE (HCC): ICD-10-CM

## 2025-04-08 DIAGNOSIS — R03.0 ELEVATED BLOOD PRESSURE READING: ICD-10-CM

## 2025-04-08 DIAGNOSIS — Z72.0 TOBACCO USE: ICD-10-CM

## 2025-04-08 DIAGNOSIS — R64 PULMONARY CACHEXIA DUE TO CHRONIC OBSTRUCTIVE PULMONARY DISEASE (HCC): ICD-10-CM

## 2025-04-08 DIAGNOSIS — J43.1 PANLOBULAR EMPHYSEMA (HCC): Primary | ICD-10-CM

## 2025-04-08 PROCEDURE — 99214 OFFICE O/P EST MOD 30 MIN: CPT | Performed by: NURSE PRACTITIONER

## 2025-04-08 PROCEDURE — 1123F ACP DISCUSS/DSCN MKR DOCD: CPT | Performed by: NURSE PRACTITIONER

## 2025-04-08 PROCEDURE — G0296 VISIT TO DETERM LDCT ELIG: HCPCS | Performed by: NURSE PRACTITIONER

## 2025-04-08 RX ORDER — CYCLOBENZAPRINE HCL 10 MG
TABLET ORAL
COMMUNITY

## 2025-04-08 ASSESSMENT — ENCOUNTER SYMPTOMS
COUGH: 0
CHEST TIGHTNESS: 0
RHINORRHEA: 0
ABDOMINAL PAIN: 0
WHEEZING: 0
SHORTNESS OF BREATH: 1
SORE THROAT: 0
EYE PAIN: 0

## 2025-04-08 ASSESSMENT — SLEEP AND FATIGUE QUESTIONNAIRES
ESS TOTAL SCORE: 11
HOW LIKELY ARE YOU TO NOD OFF OR FALL ASLEEP WHILE LYING DOWN TO REST IN THE AFTERNOON WHEN CIRCUMSTANCES PERMIT: WOULD NEVER DOZE
HOW LIKELY ARE YOU TO NOD OFF OR FALL ASLEEP WHILE SITTING QUIETLY AFTER LUNCH WITHOUT ALCOHOL: MODERATE CHANCE OF DOZING
HOW LIKELY ARE YOU TO NOD OFF OR FALL ASLEEP WHEN YOU ARE A PASSENGER IN A CAR FOR AN HOUR WITHOUT A BREAK: WOULD NEVER DOZE
HOW LIKELY ARE YOU TO NOD OFF OR FALL ASLEEP IN A CAR, WHILE STOPPED FOR A FEW MINUTES IN TRAFFIC: WOULD NEVER DOZE
HOW LIKELY ARE YOU TO NOD OFF OR FALL ASLEEP WHILE SITTING INACTIVE IN A PUBLIC PLACE: HIGH CHANCE OF DOZING
HOW LIKELY ARE YOU TO NOD OFF OR FALL ASLEEP WHILE SITTING AND READING: HIGH CHANCE OF DOZING
HOW LIKELY ARE YOU TO NOD OFF OR FALL ASLEEP WHILE WATCHING TV: HIGH CHANCE OF DOZING
HOW LIKELY ARE YOU TO NOD OFF OR FALL ASLEEP WHILE SITTING AND TALKING TO SOMEONE: WOULD NEVER DOZE

## 2025-04-08 NOTE — PATIENT INSTRUCTIONS
can find some cancers early, when treatment may be more likely to work.  What happens after screening?  The results of your CT scan will be sent to your doctor. Someone from your care team will explain the results of your scan and answer any questions you may have. If you need any follow-up, he or she will help you understand what to do next.  After a lung cancer screening, you can go back to your usual activities right away.  A lung cancer screening test can't tell if you have lung cancer. If your results are positive, your doctor can't tell whether an abnormal finding is a harmless nodule, cancer, or something else without doing more tests.  What can you do to help prevent lung cancer?  Some lung cancers can't be prevented. But if you smoke, quitting smoking is the best step you can take to prevent lung cancer. If you want to quit, your doctor can recommend medicines or other ways to help.  Follow-up care is a key part of your treatment and safety. Be sure to make and go to all appointments, and call your doctor if you are having problems. It's also a good idea to know your test results and keep a list of the medicines you take.  Where can you learn more?  Go to https://www.[employer].net/patientEd and enter Q940 to learn more about \"Learning About Lung Cancer Screening.\"  Current as of: October 25, 2024  Content Version: 14.4  © 5166-8738 [employer].   Care instructions adapted under license by [employer]. If you have questions about a medical condition or this instruction, always ask your healthcare professional. [employer] disclaims any warranty or liability for your use of this information.

## 2025-04-08 NOTE — PROGRESS NOTES
MA Communication:  The following orders are received by verbal communication from Ciarra Alaniz CNP    Orders include:    Lung screen  PFT/6MW  ONPO  Follow up w/Pastora

## 2025-04-21 ENCOUNTER — HOSPITAL ENCOUNTER (OUTPATIENT)
Dept: PULMONOLOGY | Age: 70
Discharge: HOME | End: 2025-04-21
Payer: COMMERCIAL

## 2025-04-21 ENCOUNTER — RESULTS FOLLOW-UP (OUTPATIENT)
Dept: PULMONOLOGY | Age: 70
End: 2025-04-21

## 2025-04-21 ENCOUNTER — HOSPITAL ENCOUNTER (OUTPATIENT)
Dept: CT IMAGING | Age: 70
Discharge: HOME | End: 2025-04-21
Payer: COMMERCIAL

## 2025-04-21 DIAGNOSIS — Z72.0 TOBACCO USE: ICD-10-CM

## 2025-04-21 DIAGNOSIS — R64 PULMONARY CACHEXIA DUE TO CHRONIC OBSTRUCTIVE PULMONARY DISEASE (HCC): ICD-10-CM

## 2025-04-21 DIAGNOSIS — J43.1 PANLOBULAR EMPHYSEMA (HCC): ICD-10-CM

## 2025-04-21 DIAGNOSIS — Z87.891 PERSONAL HISTORY OF TOBACCO USE: ICD-10-CM

## 2025-04-21 DIAGNOSIS — J44.9 PULMONARY CACHEXIA DUE TO CHRONIC OBSTRUCTIVE PULMONARY DISEASE (HCC): ICD-10-CM

## 2025-04-21 LAB
DLCO %PRED: 27 %
DLCO PRED: NORMAL
DLCO/VA %PRED: NORMAL
DLCO/VA PRED: NORMAL
DLCO/VA: NORMAL
DLCO: NORMAL
EXPIRATORY TIME-POST: NORMAL
EXPIRATORY TIME: NORMAL
FEF 25-75 %CHNG: NORMAL
FEF 25-75 POST %PRED: NORMAL
FEF 25-75% %PRED-PRE: NORMAL
FEF 25-75% PRED: NORMAL
FEF 25-75-POST: NORMAL
FEF 25-75-PRE: NORMAL
FEV1 %PRED-POST: 31 %
FEV1 %PRED-PRE: 26 %
FEV1 PRED: NORMAL
FEV1-POST: NORMAL
FEV1-PRE: NORMAL
FEV1/FVC %PRED-POST: NORMAL
FEV1/FVC %PRED-PRE: NORMAL
FEV1/FVC PRED: NORMAL
FEV1/FVC-POST: 37 %
FEV1/FVC-PRE: 40 %
FVC %PRED-POST: NORMAL
FVC %PRED-PRE: NORMAL
FVC PRED: NORMAL
FVC-POST: NORMAL
FVC-PRE: NORMAL
GAW %PRED: NORMAL
GAW PRED: NORMAL
GAW: NORMAL
IC PRE %PRED: NORMAL
IC PRED: NORMAL
IC: NORMAL
MEP: NORMAL
MIP: NORMAL
MVV %PRED-PRE: NORMAL
MVV PRED: NORMAL
MVV-PRE: NORMAL
PEF %PRED-POST: NORMAL
PEF %PRED-PRE: NORMAL
PEF PRED: NORMAL
PEF%CHNG: NORMAL
PEF-POST: NORMAL
PEF-PRE: NORMAL
RAW %PRED: NORMAL
RAW PRED: NORMAL
RAW: NORMAL
RV PRE %PRED: NORMAL
RV PRED: NORMAL
RV: NORMAL
SVC %PRED: NORMAL
SVC PRED: NORMAL
SVC: NORMAL
TLC PRE %PRED: 119 %
TLC PRED: NORMAL
TLC: NORMAL
VA %PRED: NORMAL
VA PRED: NORMAL
VA: NORMAL
VTG %PRED: NORMAL
VTG PRED: NORMAL
VTG: NORMAL

## 2025-04-21 PROCEDURE — 94060 EVALUATION OF WHEEZING: CPT

## 2025-04-21 PROCEDURE — 94640 AIRWAY INHALATION TREATMENT: CPT

## 2025-04-21 PROCEDURE — 94618 PULMONARY STRESS TESTING: CPT

## 2025-04-21 PROCEDURE — 71271 CT THORAX LUNG CANCER SCR C-: CPT

## 2025-04-21 PROCEDURE — 6370000000 HC RX 637 (ALT 250 FOR IP): Performed by: NURSE PRACTITIONER

## 2025-04-21 PROCEDURE — 94729 DIFFUSING CAPACITY: CPT

## 2025-04-21 PROCEDURE — 94726 PLETHYSMOGRAPHY LUNG VOLUMES: CPT

## 2025-04-21 RX ORDER — ALBUTEROL SULFATE 90 UG/1
4 INHALANT RESPIRATORY (INHALATION) ONCE
Status: COMPLETED | OUTPATIENT
Start: 2025-04-21 | End: 2025-04-21

## 2025-04-21 RX ADMIN — ALBUTEROL SULFATE 4 PUFF: 90 AEROSOL, METERED RESPIRATORY (INHALATION) at 12:24

## 2025-04-21 ASSESSMENT — PULMONARY FUNCTION TESTS
FEV1_PERCENT_PREDICTED_POST: 31
FEV1/FVC_POST: 37
FEV1/FVC_PRE: 40
FEV1_PERCENT_PREDICTED_PRE: 26

## 2025-04-22 ENCOUNTER — RESULTS FOLLOW-UP (OUTPATIENT)
Dept: PULMONOLOGY | Age: 70
End: 2025-04-22

## 2025-04-24 ENCOUNTER — TELEPHONE (OUTPATIENT)
Dept: PULMONOLOGY | Age: 70
End: 2025-04-24

## 2025-04-24 NOTE — TELEPHONE ENCOUNTER
Please let Dang know her 6 minute walk test shows need for oxygen therapy 3 L with exertion.  Her PFT shows  very severe COPD. She is on appropriate medications. CT scan shows emphysema, no suspicious nodules.  She is to keep her follow up appointment with Dr. Guillory in 2 weeks.  More can be discussed at that time.  Please follow up with overnight oximetry results.

## 2025-04-24 NOTE — TELEPHONE ENCOUNTER
LM for pt. To call back. So we can go over results also need to know what DME is providing her O2 so I can send the test results and notes.

## 2025-04-28 NOTE — TELEPHONE ENCOUNTER
Encounter addended by: Christina Reyes, Med Ass't on: 4/14/2021 8:37 AM   Actions taken: Charge Capture section accepted KRISTAL for pt. To call

## 2025-04-29 NOTE — TELEPHONE ENCOUNTER
We are unable to reach this pt. By phone and will wait to see her at her upcoming appointment to discuss O2 and test results

## 2025-04-29 NOTE — TELEPHONE ENCOUNTER
Call pt LVM to return call.  Call pt contact number, daughter phone is disconnect, spouse unable to leave V.M, and call son -in law left V.M to relate message for pt to call our office.

## 2025-05-06 ENCOUNTER — OFFICE VISIT (OUTPATIENT)
Dept: PULMONOLOGY | Age: 70
End: 2025-05-06
Payer: COMMERCIAL

## 2025-05-06 VITALS
DIASTOLIC BLOOD PRESSURE: 87 MMHG | BODY MASS INDEX: 15.07 KG/M2 | TEMPERATURE: 97.6 F | WEIGHT: 88.25 LBS | RESPIRATION RATE: 16 BRPM | SYSTOLIC BLOOD PRESSURE: 173 MMHG | HEIGHT: 64 IN | HEART RATE: 96 BPM | OXYGEN SATURATION: 99 %

## 2025-05-06 DIAGNOSIS — J44.9 COPD, VERY SEVERE (HCC): ICD-10-CM

## 2025-05-06 DIAGNOSIS — J96.11 CHRONIC HYPOXIC RESPIRATORY FAILURE (HCC): Primary | ICD-10-CM

## 2025-05-06 DIAGNOSIS — Z87.891 FORMER SMOKER: ICD-10-CM

## 2025-05-06 DIAGNOSIS — J43.2 CENTRILOBULAR EMPHYSEMA (HCC): ICD-10-CM

## 2025-05-06 PROCEDURE — G2211 COMPLEX E/M VISIT ADD ON: HCPCS | Performed by: STUDENT IN AN ORGANIZED HEALTH CARE EDUCATION/TRAINING PROGRAM

## 2025-05-06 PROCEDURE — 1123F ACP DISCUSS/DSCN MKR DOCD: CPT | Performed by: STUDENT IN AN ORGANIZED HEALTH CARE EDUCATION/TRAINING PROGRAM

## 2025-05-06 PROCEDURE — 99214 OFFICE O/P EST MOD 30 MIN: CPT | Performed by: STUDENT IN AN ORGANIZED HEALTH CARE EDUCATION/TRAINING PROGRAM

## 2025-05-06 RX ORDER — ASPIRIN 81 MG/1
81 TABLET ORAL DAILY
COMMUNITY

## 2025-05-06 RX ORDER — FLUTICASONE FUROATE, UMECLIDINIUM BROMIDE AND VILANTEROL TRIFENATATE 100; 62.5; 25 UG/1; UG/1; UG/1
1 POWDER RESPIRATORY (INHALATION) DAILY
Qty: 2 EACH | Refills: 0 | Status: SHIPPED | COMMUNITY
Start: 2025-05-06

## 2025-05-06 RX ORDER — CHOLESTYRAMINE LIGHT 4 G/5.7G
4 POWDER, FOR SUSPENSION ORAL 2 TIMES DAILY
COMMUNITY

## 2025-05-06 ASSESSMENT — ENCOUNTER SYMPTOMS
DIARRHEA: 0
ABDOMINAL DISTENTION: 0
NAUSEA: 0
EYE PAIN: 0
WHEEZING: 0
EYE DISCHARGE: 0
ABDOMINAL PAIN: 0
COUGH: 0
EYE ITCHING: 0
COLOR CHANGE: 0
STRIDOR: 0
BACK PAIN: 0
EYE REDNESS: 0
CONSTIPATION: 0
TROUBLE SWALLOWING: 0
VOMITING: 0
SHORTNESS OF BREATH: 0

## 2025-05-06 NOTE — PROGRESS NOTES
[facility] Pulmonary Follow-up  [address] [place] [address]  [phone]        Dang Jacinto (: 1955 ) is a 69 y.o. female here for an evaluation of   Chief Complaint   Patient presents with    Follow-up     Pft 6 min walk and ct ONPO still not done         SUBJECTIVE/OBJECTIVE:  This 69-year-old female with significant past medical history of severe COPD, chronic hypoxic respiratory failure, former smoker that presents to [facility] pulmonary clinic for follow-up visit.  Patient is on triple therapy with Symbicort and Spiriva with albuterol as needed for shortness of breath.  She is recently started on oxygen therapy.  She has no respiratory complaints.      Review of Systems   Constitutional:  Negative for activity change, appetite change, chills, diaphoresis and fatigue.   HENT:  Negative for congestion, sore throat and trouble swallowing.    Eyes:  Negative for pain, discharge, redness and itching.   Respiratory:  Negative for cough, shortness of breath, wheezing and stridor.    Cardiovascular:  Negative for chest pain, palpitations and leg swelling.   Gastrointestinal:  Negative for abdominal distention, abdominal pain, constipation, diarrhea, nausea and vomiting.   Endocrine: Negative for polydipsia, polyphagia and polyuria.   Genitourinary:  Negative for difficulty urinating.   Musculoskeletal:  Negative for back pain, myalgias and neck pain.   Skin:  Negative for color change.   Neurological:  Negative for dizziness, weakness and light-headedness.   Psychiatric/Behavioral:  Negative for agitation and behavioral problems.          Vitals:    25 1522   BP: (!) 173/87   BP Site: Right Upper Arm   Patient Position: Sitting   BP Cuff Size: Medium Adult   Pulse: 96   Resp: 16   Temp: 97.6 °F (36.4 °C)   TempSrc: Temporal   SpO2: 99%   Weight: 40 kg (88 lb 4 oz)   Height: 1.626 m (5' 4\")        Physical Exam  Constitutional:       General: She is not in acute distress.

## 2025-05-06 NOTE — PROGRESS NOTES
MA Communication:  The following orders are received by verbal communication from Long Guillory MD    Orders include:    6 month pulm follow up  Trelegy 100 mcg samples

## (undated) DEVICE — INTENDED FOR TISSUE SEPARATION, AND OTHER PROCEDURES THAT REQUIRE A SHARP SURGICAL BLADE TO PUNCTURE OR CUT.: Brand: BARD-PARKER ® STAINLESS STEEL BLADES

## (undated) DEVICE — ZIMMER® STERILE DISPOSABLE TOURNIQUET CUFF WITH PLC, DUAL PORT, SINGLE BLADDER, 30 IN. (76 CM)

## (undated) DEVICE — PADDING CAST W4INXL4YD NONSTERILE COT RAYON MICROPLEATED

## (undated) DEVICE — SUTURE VCRL SZ 2-0 L18IN ABSRB UD CT-1 L36MM 1/2 CIR J839D

## (undated) DEVICE — BIT DRL L145MM DIA4.5MM QUIK CPL W/O STP REUSE

## (undated) DEVICE — NEEDLE HYPO 22GA L1.5IN BLK POLYPR HUB S STL REG BVL STR

## (undated) DEVICE — ZIMMER® STERILE DISPOSABLE TOURNIQUET CUFF WITH PLC, DUAL PORT, SINGLE BLADDER, 24 IN. (61 CM)

## (undated) DEVICE — SOLUTION IV 1000ML LAC RINGERS PH 6.5 INJ USP VIAFLX PLAS

## (undated) DEVICE — SOLUTION IV IRRIG 500ML 0.9% SODIUM CHL 2F7123

## (undated) DEVICE — TOWEL,OR,DSP,ST,BLUE,STD,8/PK,10PK/CS: Brand: MEDLINE

## (undated) DEVICE — SPLINT ORTH W4XL30IN LAYERED FBRGLS FOAM PD BRTH BK MOLD

## (undated) DEVICE — CATHETER IV 20GA L1.25IN PNK FEP SFTY STR HUB RADPQ DISP

## (undated) DEVICE — Z CONVERTED USE 2273232 BANDAGE COMPR W6INXL11YD E KNIT DBL SELF CLSR EZE-BAND

## (undated) DEVICE — SUTURE NONABSORBABLE MONOFILAMENT 3-0 PS-1 18 IN BLK ETHILON 1663H

## (undated) DEVICE — 3M™ TEGADERM™ TRANSPARENT FILM DRESSING FRAME STYLE, 1624W, 2-3/8 IN X 2-3/4 IN (6 CM X 7 CM), 100/CT 4CT/CASE: Brand: 3M™ TEGADERM™

## (undated) DEVICE — GLOVE,SURG,SENSICARE,ALOE,LF,PF,7: Brand: MEDLINE

## (undated) DEVICE — GLOVE SURG SZ 8 L12IN FNGR THK94MIL STD WHT LTX FREE

## (undated) DEVICE — C-ARM: Brand: UNBRANDED

## (undated) DEVICE — PACK EXTREMITY XR

## (undated) DEVICE — SET GRAV VENT NVENT CK VLV 3 NDL FREE PRT 10 GTT

## (undated) DEVICE — GLOVE,SURG,SENSICARE,ALOE,LF,PF,9: Brand: MEDLINE

## (undated) DEVICE — BIT DRL L145MM DIA3.2MM QUIK CPL W/O STP REUSE

## (undated) DEVICE — 1200CC HIFLOW SUCTION CANISTER WITH AEROSTAT FILTER, FLOAT VALVE SHUTOFF WITH GREEN LID: Brand: BEMIS

## (undated) DEVICE — SHEET,DRAPE,53X77,STERILE: Brand: MEDLINE

## (undated) DEVICE — ABDOMINAL PAD: Brand: DERMACEA

## (undated) DEVICE — SET ADMIN PRIMING 7ML L30IN 7.35LB 20 GTT 2ND RLER CLMP